# Patient Record
Sex: FEMALE | Race: WHITE | NOT HISPANIC OR LATINO | Employment: FULL TIME | URBAN - METROPOLITAN AREA
[De-identification: names, ages, dates, MRNs, and addresses within clinical notes are randomized per-mention and may not be internally consistent; named-entity substitution may affect disease eponyms.]

---

## 2017-04-28 ENCOUNTER — TRANSCRIBE ORDERS (OUTPATIENT)
Dept: INTERNAL MEDICINE | Facility: CLINIC | Age: 52
End: 2017-04-28

## 2017-04-28 DIAGNOSIS — Z12.31 VISIT FOR SCREENING MAMMOGRAM: Primary | ICD-10-CM

## 2017-08-16 DIAGNOSIS — G44.219 EPISODIC TENSION-TYPE HEADACHE, NOT INTRACTABLE: ICD-10-CM

## 2017-08-17 RX ORDER — RIZATRIPTAN BENZOATE 10 MG/1
TABLET, ORALLY DISINTEGRATING ORAL
Qty: 133 TABLET | Refills: 0 | OUTPATIENT
Start: 2017-08-17

## 2017-10-11 ENCOUNTER — HOSPITAL ENCOUNTER (OUTPATIENT)
Dept: MAMMOGRAPHY | Facility: HOSPITAL | Age: 52
Discharge: HOME | End: 2017-10-11
Attending: INTERNAL MEDICINE | Admitting: INTERNAL MEDICINE

## 2017-10-11 DIAGNOSIS — Z12.31 VISIT FOR SCREENING MAMMOGRAM: ICD-10-CM

## 2017-10-11 PROCEDURE — G0202 SCR MAMMO BI INCL CAD: HCPCS

## 2017-10-11 PROCEDURE — 77063 BREAST TOMOSYNTHESIS BI: CPT

## 2017-10-12 PROCEDURE — 77067 SCR MAMMO BI INCL CAD: CPT | Performed by: RADIOLOGY

## 2017-10-12 PROCEDURE — 77063 BREAST TOMOSYNTHESIS BI: CPT | Performed by: RADIOLOGY

## 2017-12-19 ENCOUNTER — OFFICE VISIT (OUTPATIENT)
Dept: INTERNAL MEDICINE | Facility: CLINIC | Age: 52
End: 2017-12-19

## 2017-12-19 VITALS
BODY MASS INDEX: 22.33 KG/M2 | HEART RATE: 72 BPM | SYSTOLIC BLOOD PRESSURE: 120 MMHG | WEIGHT: 131.13 LBS | RESPIRATION RATE: 20 BRPM | TEMPERATURE: 97.2 F | DIASTOLIC BLOOD PRESSURE: 70 MMHG

## 2017-12-19 DIAGNOSIS — G44.219 EPISODIC TENSION-TYPE HEADACHE, NOT INTRACTABLE: ICD-10-CM

## 2017-12-19 DIAGNOSIS — J06.9 UPPER RESPIRATORY TRACT INFECTION, UNSPECIFIED TYPE: Primary | ICD-10-CM

## 2017-12-19 LAB
EXPIRATION DATE: NORMAL
FLUAV AG NPH QL: NEGATIVE
FLUBV AG NPH QL: NEGATIVE
INTERNAL CONTROL: NORMAL
Lab: NORMAL

## 2017-12-19 PROCEDURE — 99214 OFFICE O/P EST MOD 30 MIN: CPT | Performed by: INTERNAL MEDICINE

## 2017-12-19 PROCEDURE — 87804 INFLUENZA ASSAY W/OPTIC: CPT | Performed by: INTERNAL MEDICINE

## 2017-12-19 RX ORDER — RIZATRIPTAN BENZOATE 10 MG/1
10 TABLET, ORALLY DISINTEGRATING ORAL ONCE AS NEEDED
Qty: 9 TABLET | Refills: 11 | Status: SHIPPED | OUTPATIENT
Start: 2017-12-19 | End: 2020-01-13 | Stop reason: SDUPTHER

## 2017-12-19 RX ORDER — MELOXICAM 15 MG/1
15 TABLET ORAL DAILY
Qty: 30 TABLET | Refills: 0 | OUTPATIENT
Start: 2017-12-19 | End: 2020-08-22

## 2017-12-19 RX ORDER — AMOXICILLIN 875 MG/1
875 TABLET, COATED ORAL 2 TIMES DAILY
Qty: 20 TABLET | Refills: 0 | Status: SHIPPED | OUTPATIENT
Start: 2017-12-19 | End: 2017-12-29

## 2017-12-19 RX ORDER — MELOXICAM 15 MG/1
TABLET ORAL
Refills: 0 | COMMUNITY
Start: 2017-10-25 | End: 2017-12-19 | Stop reason: SDUPTHER

## 2017-12-19 NOTE — PROGRESS NOTES
Yanira Arshad is a 52 y.o. female.     Chief Complaint   Patient presents with   • Cough     sinus drainage    • URI       History obtained from the patient.      Cough   This is a new problem. Episode onset: 2-3 days. The problem has been gradually worsening. The cough is non-productive (dry). Associated symptoms include nasal congestion, postnasal drip, rhinorrhea (clear) and shortness of breath. Pertinent negatives include no chest pain, chills, ear pain, eye redness, fever, headaches, hemoptysis, myalgias, rash, sore throat (scratchy) or wheezing. Treatments tried: Robitussin. The treatment provided mild relief. There is no history of asthma or environmental allergies.   URI    This is a new problem. The current episode started today. The problem has been unchanged. There has been no fever. Associated symptoms include congestion, coughing, rhinorrhea (clear) and sinus pain. Pertinent negatives include no abdominal pain, chest pain, diarrhea, ear pain, headaches, joint pain, joint swelling, nausea, rash, sore throat (scratchy), swollen glands, vomiting or wheezing. Treatments tried: Robitussin. The treatment provided mild relief.      The patient states she will be visiting her elderly mother.    The following portions of the patient's history were reviewed and updated as appropriate: allergies, current medications, past family history, past medical history, past social history, past surgical history and problem list.      Review of Systems   Constitutional: Negative for chills and fever.   HENT: Positive for congestion, postnasal drip, rhinorrhea (clear), sinus pain and sinus pressure. Negative for ear pain, sore throat (scratchy) and voice change.    Eyes: Negative for pain, discharge, redness and itching.   Respiratory: Positive for cough and shortness of breath. Negative for hemoptysis and wheezing.    Cardiovascular: Negative for chest pain.   Gastrointestinal: Negative for abdominal pain,  diarrhea, nausea and vomiting.   Musculoskeletal: Negative for arthralgias, joint pain, joint swelling and myalgias.   Skin: Negative for rash.   Allergic/Immunologic: Negative for environmental allergies.   Neurological: Negative for headaches.   Hematological: Negative for adenopathy.         Blood pressure 120/70, pulse 72, temperature 97.2 °F (36.2 °C), temperature source Temporal Artery , resp. rate 20, weight 59.5 kg (131 lb 2 oz).      Objective     Physical Exam   Constitutional: She appears well-developed and well-nourished.   HENT:   Head: Normocephalic and atraumatic.   Right Ear: Tympanic membrane, external ear and ear canal normal.   Left Ear: Tympanic membrane, external ear and ear canal normal.   Mouth/Throat: Oropharynx is clear and moist and mucous membranes are normal. No oral lesions.   Tonsils normal.  No sinus tenderness to palpation.   Eyes: Conjunctivae are normal.   Neck: Normal range of motion. Neck supple.   Cardiovascular: Normal rate and regular rhythm.    No murmur heard.  Pulmonary/Chest: Effort normal. She has wheezes (scattered). She has no rhonchi.   Lymphadenopathy:     She has no cervical adenopathy (there is tenderness to palpation in the anterior, but not posterior,  cervical area bilaterally).   Skin: No rash noted.   Psychiatric: She has a normal mood and affect.   Nursing note and vitals reviewed.         Results for orders placed or performed in visit on 12/19/17   POC Influenza A / B   Result Value Ref Range    Rapid Influenza A Ag NEGATIVE     Rapid Influenza B Ag NEGATIVE     Internal Control Passed Passed    Lot Number [id number]     Expiration Date 6-19          Assessment/Plan   Myar was seen today for cough and uri.    Diagnoses and all orders for this visit:    Upper respiratory tract infection, unspecified type  -     POC Influenza A / B  -     amoxicillin (AMOXIL) 875 MG tablet; Take 1 tablet by mouth 2 (Two) Times a Day for 10 days.    Episodic tension-type headache,  not intractable  -     rizatriptan MLT (MAXALT-MLT) 10 MG disintegrating tablet; Take 1 tablet by mouth 1 (One) Time As Needed (for headache) for up to 1 dose. May repeat in 2 hours if needed    Other orders  -     meloxicam (MOBIC) 15 MG tablet; Take 1 tablet by mouth Daily.        Return if symptoms worsen or fail to improve.

## 2018-03-28 ENCOUNTER — OFFICE VISIT (OUTPATIENT)
Dept: INTERNAL MEDICINE | Facility: CLINIC | Age: 53
End: 2018-03-28

## 2018-03-28 ENCOUNTER — TELEPHONE (OUTPATIENT)
Dept: INTERNAL MEDICINE | Facility: CLINIC | Age: 53
End: 2018-03-28

## 2018-03-28 VITALS
TEMPERATURE: 101.3 F | BODY MASS INDEX: 20.47 KG/M2 | SYSTOLIC BLOOD PRESSURE: 102 MMHG | WEIGHT: 120.2 LBS | RESPIRATION RATE: 18 BRPM | DIASTOLIC BLOOD PRESSURE: 60 MMHG | HEART RATE: 88 BPM

## 2018-03-28 DIAGNOSIS — R50.9 FEVER, UNSPECIFIED FEVER CAUSE: Primary | ICD-10-CM

## 2018-03-28 DIAGNOSIS — J10.1 INFLUENZA B: ICD-10-CM

## 2018-03-28 LAB
EXPIRATION DATE: NORMAL
FLUAV AG NPH QL: POSITIVE
FLUBV AG NPH QL: NEGATIVE
INTERNAL CONTROL: NORMAL
Lab: NORMAL

## 2018-03-28 PROCEDURE — 99213 OFFICE O/P EST LOW 20 MIN: CPT | Performed by: NURSE PRACTITIONER

## 2018-03-28 PROCEDURE — 87804 INFLUENZA ASSAY W/OPTIC: CPT | Performed by: NURSE PRACTITIONER

## 2018-03-28 RX ORDER — OSELTAMIVIR PHOSPHATE 75 MG/1
75 CAPSULE ORAL 2 TIMES DAILY
Qty: 10 CAPSULE | Refills: 0 | Status: SHIPPED | OUTPATIENT
Start: 2018-03-28 | End: 2018-08-22

## 2018-03-28 NOTE — TELEPHONE ENCOUNTER
----- Message from Vandana Garcia sent at 3/28/2018 11:39 AM EDT -----  PATIENT HAS ACHY JOINTS AND FEELS LIKE SHE HAS A SINUS INFECTION. PATIENT IS WANTING TO BE WORKED IN TODAY, BUT IS CURRENTLY AT ANOTHER APPT AND CAN'T BE IN UNTIL 2.     PLEASE CALL PATIENT At: [phone]    THANK YOU.

## 2018-03-28 NOTE — PROGRESS NOTES
Chief Complaint   Patient presents with   • Fever     started last night        Subjective     History of Present Illness   The pt has had fever aches 1 d took triptan for HA less but runny now cough now not really with ST felt N no D.     The following portions of the patient's history were reviewed and updated as appropriate: allergies, current medications, past family history, past medical history, past social history, past surgical history and problem list.    Review of Systems   Constitutional: Positive for fever. Negative for activity change, appetite change, chills and fatigue.   HENT: Positive for congestion, postnasal drip, rhinorrhea, sinus pressure, sneezing and sore throat.    Eyes: Negative for visual disturbance.   Respiratory: Positive for cough. Negative for shortness of breath.    Cardiovascular: Negative for chest pain, palpitations and leg swelling.   Gastrointestinal: Positive for nausea. Negative for abdominal pain, constipation, diarrhea and GERD.   Musculoskeletal: Negative for arthralgias and myalgias.   Skin: Negative for rash.   Allergic/Immunologic: Negative for environmental allergies.   Neurological: Positive for headaches. Negative for dizziness.   Psychiatric/Behavioral: Negative for sleep disturbance.   All other systems reviewed and are negative.      Objective   Physical Exam   Constitutional: She is oriented to person, place, and time. She appears well-developed and well-nourished.   HENT:   Head: Normocephalic and atraumatic.   Right Ear: Hearing, tympanic membrane, external ear and ear canal normal.   Left Ear: Hearing, tympanic membrane, external ear and ear canal normal.   Nose: Mucosal edema, rhinorrhea and sinus tenderness present. Right sinus exhibits no maxillary sinus tenderness and no frontal sinus tenderness. Left sinus exhibits no maxillary sinus tenderness and no frontal sinus tenderness.   Mouth/Throat: Uvula is midline and mucous membranes are normal. Posterior  oropharyngeal erythema present.   Eyes: Conjunctivae are normal. Pupils are equal, round, and reactive to light. Right eye exhibits no discharge. Left eye exhibits no discharge. No scleral icterus.   Neck: Normal range of motion. Neck supple. No thyromegaly present.   Cardiovascular: Normal rate, regular rhythm, normal heart sounds and intact distal pulses.  Exam reveals no friction rub.    No murmur heard.  Pulmonary/Chest: Effort normal and breath sounds normal.   Abdominal: Soft. Bowel sounds are normal. She exhibits no distension and no mass. There is no tenderness. There is no rebound and no guarding. No hernia.   Musculoskeletal: Normal range of motion. She exhibits no edema.   Lymphadenopathy:     She has no cervical adenopathy.   Neurological: She is alert and oriented to person, place, and time. She has normal reflexes.   Skin: Skin is warm and dry.   Psychiatric: She has a normal mood and affect. Her behavior is normal. Judgment and thought content normal.   Nursing note and vitals reviewed.      Results for orders placed or performed in visit on 03/28/18   POC Influenza A / B   Result Value Ref Range    Rapid Influenza A Ag POSITIVE     Rapid Influenza B Ag NEGATIVE     Internal Control Passed Passed    Lot Number [id number]     Expiration Date 11-8-20         Assessment/Plan   Problems Addressed this Visit     None      Visit Diagnoses     Fever, unspecified fever cause    -  Primary    Relevant Orders    POC Influenza A / B (Completed)    Influenza B        Relevant Medications    oseltamivir (TAMIFLU) 75 MG capsule      rest fluid treat with otc meds as directed no changes worsening will call.  Reviewed risk of secondary bacteria   Return if symptoms worsen or fail to improve.  RTC/call  If symptoms worsen  Meds MOA and SE's reviewed and pt v/u

## 2018-08-22 ENCOUNTER — OFFICE VISIT (OUTPATIENT)
Dept: INTERNAL MEDICINE | Facility: CLINIC | Age: 53
End: 2018-08-22

## 2018-08-22 VITALS
HEIGHT: 64 IN | BODY MASS INDEX: 20.32 KG/M2 | HEART RATE: 72 BPM | SYSTOLIC BLOOD PRESSURE: 112 MMHG | RESPIRATION RATE: 20 BRPM | WEIGHT: 119 LBS | DIASTOLIC BLOOD PRESSURE: 68 MMHG | TEMPERATURE: 98.3 F

## 2018-08-22 DIAGNOSIS — J45.20 MILD INTERMITTENT ASTHMA WITHOUT COMPLICATION: ICD-10-CM

## 2018-08-22 DIAGNOSIS — Z13.6 SCREENING FOR CARDIOVASCULAR CONDITION: ICD-10-CM

## 2018-08-22 DIAGNOSIS — Z23 NEED FOR STREPTOCOCCUS PNEUMONIAE VACCINATION: ICD-10-CM

## 2018-08-22 DIAGNOSIS — G43.709 CHRONIC MIGRAINE WITHOUT AURA WITHOUT STATUS MIGRAINOSUS, NOT INTRACTABLE: ICD-10-CM

## 2018-08-22 DIAGNOSIS — Z78.0 MENOPAUSE: ICD-10-CM

## 2018-08-22 DIAGNOSIS — R06.83 SNORING: ICD-10-CM

## 2018-08-22 DIAGNOSIS — Z01.419 ENCOUNTER FOR CERVICAL PAP SMEAR WITH PELVIC EXAM: ICD-10-CM

## 2018-08-22 DIAGNOSIS — Z00.00 ENCOUNTER FOR HEALTH MAINTENANCE EXAMINATION IN ADULT: Primary | ICD-10-CM

## 2018-08-22 PROBLEM — G43.909 MIGRAINE: Status: ACTIVE | Noted: 2018-08-22

## 2018-08-22 PROCEDURE — 99396 PREV VISIT EST AGE 40-64: CPT | Performed by: INTERNAL MEDICINE

## 2018-08-22 PROCEDURE — 90471 IMMUNIZATION ADMIN: CPT | Performed by: INTERNAL MEDICINE

## 2018-08-22 RX ORDER — ALBUTEROL SULFATE 90 UG/1
2 AEROSOL, METERED RESPIRATORY (INHALATION) EVERY 4 HOURS PRN
COMMUNITY
End: 2019-11-14

## 2018-08-22 NOTE — PATIENT INSTRUCTIONS
The patient agrees to return for fasting labs.    Recommended Shigrix (new Shingles vaccine) at the Pharmacy.        Health Maintenance for Postmenopausal Women  Menopause is a normal process in which your reproductive ability comes to an end. This process happens gradually over a span of months to years, usually between the ages of 48 and 55. Menopause is complete when you have missed 12 consecutive menstrual periods.  It is important to talk with your health care provider about some of the most common conditions that affect postmenopausal women, such as heart disease, cancer, and bone loss (osteoporosis). Adopting a healthy lifestyle and getting preventive care can help to promote your health and wellness. Those actions can also lower your chances of developing some of these common conditions.  What should I know about menopause?  During menopause, you may experience a number of symptoms, such as:  · Moderate-to-severe hot flashes.  · Night sweats.  · Decrease in sex drive.  · Mood swings.  · Headaches.  · Tiredness.  · Irritability.  · Memory problems.  · Insomnia.    Choosing to treat or not to treat menopausal changes is an individual decision that you make with your health care provider.  What should I know about hormone replacement therapy and supplements?  Hormone therapy products are effective for treating symptoms that are associated with menopause, such as hot flashes and night sweats. Hormone replacement carries certain risks, especially as you become older. If you are thinking about using estrogen or estrogen with progestin treatments, discuss the benefits and risks with your health care provider.  What should I know about heart disease and stroke?  Heart disease, heart attack, and stroke become more likely as you age. This may be due, in part, to the hormonal changes that your body experiences during menopause. These can affect how your body processes dietary fats, triglycerides, and cholesterol. Heart  attack and stroke are both medical emergencies.  There are many things that you can do to help prevent heart disease and stroke:  · Have your blood pressure checked at least every 1-2 years. High blood pressure causes heart disease and increases the risk of stroke.  · If you are 55-79 years old, ask your health care provider if you should take aspirin to prevent a heart attack or a stroke.  · Do not use any tobacco products, including cigarettes, chewing tobacco, or electronic cigarettes. If you need help quitting, ask your health care provider.  · It is important to eat a healthy diet and maintain a healthy weight.  ? Be sure to include plenty of vegetables, fruits, low-fat dairy products, and lean protein.  ? Avoid eating foods that are high in solid fats, added sugars, or salt (sodium).  · Get regular exercise. This is one of the most important things that you can do for your health.  ? Try to exercise for at least 150 minutes each week. The type of exercise that you do should increase your heart rate and make you sweat. This is known as moderate-intensity exercise.  ? Try to do strengthening exercises at least twice each week. Do these in addition to the moderate-intensity exercise.  · Know your numbers. Ask your health care provider to check your cholesterol and your blood glucose. Continue to have your blood tested as directed by your health care provider.    What should I know about cancer screening?  There are several types of cancer. Take the following steps to reduce your risk and to catch any cancer development as early as possible.  Breast Cancer  · Practice breast self-awareness.  ? This means understanding how your breasts normally appear and feel.  ? It also means doing regular breast self-exams. Let your health care provider know about any changes, no matter how small.  · If you are 40 or older, have a clinician do a breast exam (clinical breast exam or CBE) every year. Depending on your age, family  history, and medical history, it may be recommended that you also have a yearly breast X-ray (mammogram).  · If you have a family history of breast cancer, talk with your health care provider about genetic screening.  · If you are at high risk for breast cancer, talk with your health care provider about having an MRI and a mammogram every year.  · Breast cancer (BRCA) gene test is recommended for women who have family members with BRCA-related cancers. Results of the assessment will determine the need for genetic counseling and BRCA1 and for BRCA2 testing. BRCA-related cancers include these types:  ? Breast. This occurs in males or females.  ? Ovarian.  ? Tubal. This may also be called fallopian tube cancer.  ? Cancer of the abdominal or pelvic lining (peritoneal cancer).  ? Prostate.  ? Pancreatic.    Cervical, Uterine, and Ovarian Cancer  Your health care provider may recommend that you be screened regularly for cancer of the pelvic organs. These include your ovaries, uterus, and vagina. This screening involves a pelvic exam, which includes checking for microscopic changes to the surface of your cervix (Pap test).  · For women ages 21-65, health care providers may recommend a pelvic exam and a Pap test every three years. For women ages 30-65, they may recommend the Pap test and pelvic exam, combined with testing for human papilloma virus (HPV), every five years. Some types of HPV increase your risk of cervical cancer. Testing for HPV may also be done on women of any age who have unclear Pap test results.  · Other health care providers may not recommend any screening for nonpregnant women who are considered low risk for pelvic cancer and have no symptoms. Ask your health care provider if a screening pelvic exam is right for you.  · If you have had past treatment for cervical cancer or a condition that could lead to cancer, you need Pap tests and screening for cancer for at least 20 years after your treatment. If  Pap tests have been discontinued for you, your risk factors (such as having a new sexual partner) need to be reassessed to determine if you should start having screenings again. Some women have medical problems that increase the chance of getting cervical cancer. In these cases, your health care provider may recommend that you have screening and Pap tests more often.  · If you have a family history of uterine cancer or ovarian cancer, talk with your health care provider about genetic screening.  · If you have vaginal bleeding after reaching menopause, tell your health care provider.  · There are currently no reliable tests available to screen for ovarian cancer.    Lung Cancer  Lung cancer screening is recommended for adults 55-80 years old who are at high risk for lung cancer because of a history of smoking. A yearly low-dose CT scan of the lungs is recommended if you:  · Currently smoke.  · Have a history of at least 30 pack-years of smoking and you currently smoke or have quit within the past 15 years. A pack-year is smoking an average of one pack of cigarettes per day for one year.    Yearly screening should:  · Continue until it has been 15 years since you quit.  · Stop if you develop a health problem that would prevent you from having lung cancer treatment.    Colorectal Cancer  · This type of cancer can be detected and can often be prevented.  · Routine colorectal cancer screening usually begins at age 50 and continues through age 75.  · If you have risk factors for colon cancer, your health care provider may recommend that you be screened at an earlier age.  · If you have a family history of colorectal cancer, talk with your health care provider about genetic screening.  · Your health care provider may also recommend using home test kits to check for hidden blood in your stool.  · A small camera at the end of a tube can be used to examine your colon directly (sigmoidoscopy or colonoscopy). This is done to  check for the earliest forms of colorectal cancer.  · Direct examination of the colon should be repeated every 5-10 years until age 75. However, if early forms of precancerous polyps or small growths are found or if you have a family history or genetic risk for colorectal cancer, you may need to be screened more often.    Skin Cancer  · Check your skin from head to toe regularly.  · Monitor any moles. Be sure to tell your health care provider:  ? About any new moles or changes in moles, especially if there is a change in a mole's shape or color.  ? If you have a mole that is larger than the size of a pencil eraser.  · If any of your family members has a history of skin cancer, especially at a young age, talk with your health care provider about genetic screening.  · Always use sunscreen. Apply sunscreen liberally and repeatedly throughout the day.  · Whenever you are outside, protect yourself by wearing long sleeves, pants, a wide-brimmed hat, and sunglasses.    What should I know about osteoporosis?  Osteoporosis is a condition in which bone destruction happens more quickly than new bone creation. After menopause, you may be at an increased risk for osteoporosis. To help prevent osteoporosis or the bone fractures that can happen because of osteoporosis, the following is recommended:  · If you are 19-50 years old, get at least 1,000 mg of calcium and at least 600 mg of vitamin D per day.  · If you are older than age 50 but younger than age 70, get at least 1,200 mg of calcium and at least 600 mg of vitamin D per day.  · If you are older than age 70, get at least 1,200 mg of calcium and at least 800 mg of vitamin D per day.    Smoking and excessive alcohol intake increase the risk of osteoporosis. Eat foods that are rich in calcium and vitamin D, and do weight-bearing exercises several times each week as directed by your health care provider.  What should I know about how menopause affects my mental  health?  Depression may occur at any age, but it is more common as you become older. Common symptoms of depression include:  · Low or sad mood.  · Changes in sleep patterns.  · Changes in appetite or eating patterns.  · Feeling an overall lack of motivation or enjoyment of activities that you previously enjoyed.  · Frequent crying spells.    Talk with your health care provider if you think that you are experiencing depression.  What should I know about immunizations?  It is important that you get and maintain your immunizations. These include:  · Tetanus, diphtheria, and pertussis (Tdap) booster vaccine.  · Influenza every year before the flu season begins.  · Pneumonia vaccine.  · Shingles vaccine.    Your health care provider may also recommend other immunizations.  This information is not intended to replace advice given to you by your health care provider. Make sure you discuss any questions you have with your health care provider.  Document Released: 02/09/2007 Document Revised: 07/07/2017 Document Reviewed: 09/20/2016  [email or web address] Interactive Patient Education © 2018 [employer]

## 2018-08-22 NOTE — PROGRESS NOTES
Subjective     Chief Complaint:  Physical Exam.    History of Present Illness    History obtained from the patient.    The patient has a history of Asthma and Environmental Allergies, which are stable.     Symptoms: Has some shortness of breath with exertion, usually when running.  Denies resting shortness of breath, wheezing, cough, and hemoptysis.  Medication: Albuterol inhaler as needed.  She does not take any medication for her allergies.    The patient has history of Migraine Headaches, which have essentially resolved with a dietary change,  eliminating sugar.    Medication: Maxalt as needed.    The patient states her Osteoarthritis has also essentially resolved with her diet change.    Myra Arshad is a 53 y.o. female who presents for an Annual Physical.      The patient presents for a Pap Smear. She is G [1 ] P [0 ]. AB [1 ]. She is Menopausal.  Her last menstrual period was [2016].   She [denies] dyspareunia. She [is not] sexually active.  Her last Pap smear was [12/3/15, normal ]. She [denies] abnormal Pap Smears in the past. She [denies] sexually-transmitted diseases and HPV in the past. She [denies] JACOB Exposure in utero.     She [does] do self breast exam. She [denies) breast mass, breast pain, nipple inversion, or nipple discharge. Last Mammogram was [10/11/17-cat 1 ]. Last DEXA Scan was [N/A ]. Last Colonoscopy was [12/9/15, normal ].    Her MGM had Breast Cancer. She [denies] a family history of Colon Cancer, Colon Polyps, Uterine Cancer, Cervical Cancer, or Ovarian Cancer.    PMH, PSH, SocHx, FamHx, Allergies, and Medications: Reviewed and updated.    Outpatient Medications Prior to Visit   Medication Sig Dispense Refill   • meloxicam (MOBIC) 15 MG tablet Take 1 tablet by mouth Daily. (Patient taking differently: Take 15 mg by mouth Daily As Needed.) 30 tablet 0   • rizatriptan MLT (MAXALT-MLT) 10 MG disintegrating tablet Take 1 tablet by mouth 1 (One) Time As Needed (for headache) for up  to 1 dose. May repeat in 2 hours if needed 9 tablet 11   • oseltamivir (TAMIFLU) 75 MG capsule Take 1 capsule by mouth 2 (Two) Times a Day. 10 capsule 0     No facility-administered medications prior to visit.        Immunization History   Administered Date(s) Administered   • Influenza, Quadrivalent 11/10/2015   • Tdap 11/10/2015         Patient Active Problem List   Diagnosis   • Seasonal allergic rhinitis   • Asthma   • Menopause   • Migraine       Health Habits:  Dental Exam. up to date  Eye Exam. up to date  Hearing Loss:  No  Exercise: 3 times/week.  Current exercise activities include: light weights/kettlebells and running/ jogging  Diet: Healthy  Multivitamin: No    Safe Driving:  Yes  Seat Belt:  Yes  Bike Helmet:  N/A  Skin Screening:  Yes  Sunscreen: Yes  SBE / BESSY: Yes  Sexual Activity:  Not currently  Birth Control:  Menopause N/A  STD Prevention:  N/A    Last Pap: 12/3/15, negative  Last Mammogram:  10/11/17, cat 1  Last DEXA Scan: N/A  Last Colonoscopy: 12/9/15, normal  Last PSA: N/a    Social:    Social History     Social History   • Marital status: Single     Spouse name: N/A   • Number of children: 0   • Years of education: N/A     Occupational History   • Realtor      full time     Social History Main Topics   • Smoking status: Former Smoker     Start date: 1984     Quit date: 2003   • Smokeless tobacco: Never Used      Comment: 1-2 ppd [id number] (Quit)   • Alcohol use No      Comment: drank heavily approx [id number] (Quit)   • Drug use: No   • Sexual activity: Not Currently     Partners: Male     Birth control/ protection: Post-menopausal     Other Topics Concern   • Not on file     Social History Narrative   • No narrative on file         Current Medical Providers:    Akosua Caldwell MD (Internal Medicine / Pediatrics)    The [facility] providers who are involved in the care of this patient are listed above.         Review of Systems   Constitutional: Positive for fatigue. Negative for chills,  fever and unexpected weight change.        Has night sweats.  No generalized pain.   HENT: Negative for congestion, ear pain, hearing loss, nosebleeds, postnasal drip, rhinorrhea, sinus pressure, sneezing, sore throat, tinnitus and voice change.         Reports snoring.  She states she had a sleep study 5 years ago, was told she needed CPAP.   Eyes: Negative for photophobia, pain, discharge, redness, itching and visual disturbance.   Respiratory: Positive for chest tightness. Negative for cough, shortness of breath, wheezing and stridor.          Has dyspnea on exertion, not new, but no orthopnea or PND.  No chest congestion.   No  hemoptysis.   Cardiovascular: Negative for chest pain, palpitations and leg swelling.        No claudication or syncope.   Gastrointestinal: Negative for abdominal pain, blood in stool, constipation, diarrhea, nausea, rectal pain and vomiting.        No hematemesis.  No heartburn, dysphagia or odynophagia.  No belching or bloating.  No melena.   Endocrine: Negative for cold intolerance, heat intolerance, polydipsia, polyphagia and polyuria.        No hair loss or dry skin.  No generalized weakness.  No hot flashes.   Genitourinary: Positive for vaginal discharge (white intermittent). Negative for difficulty urinating, dyspareunia, dysuria, flank pain, frequency, hematuria, pelvic pain, urgency and vaginal bleeding.        No nocturia, incomplete emptying, or incontinence.   Musculoskeletal: Negative for arthralgias, back pain, gait problem, joint swelling, myalgias, neck pain and neck stiffness.        No joint stiffness. Hip pain resolved with a change in diet- cutting out sugar.   Skin: Negative for rash.        No new skin lesions or changes in skin lesions. No breast pain or masses.  No nipple discharge or nipple inversion.   Neurological: Positive for headaches (occasional migraine, mostly resolved with a change in diet- cutting out sugar.  ). Negative for dizziness, tremors,  "syncope, speech difficulty, weakness, light-headedness and numbness.        No tingling.  Has occasional memory loss and decreased concentration.   Hematological: Negative for adenopathy. Does not bruise/bleed easily.   Psychiatric/Behavioral: Negative for confusion, sleep disturbance and suicidal ideas. The patient is not nervous/anxious.         No depression.           Objective     Vitals:    08/22/18 1400   BP: 112/68   BP Location: Right arm   Pulse: 72   Resp: 20   Temp: 98.3 °F (36.8 °C)   TempSrc: Temporal Artery    Weight: 54 kg (119 lb)   Height: 162.6 cm (64\")       Body mass index is 20.43 kg/m².    Physical Exam   Constitutional: She appears well-developed and well-nourished.   HENT:   Head: Normocephalic and atraumatic.   Right Ear: Tympanic membrane, external ear and ear canal normal.   Left Ear: Tympanic membrane, external ear and ear canal normal.   Mouth/Throat: Oropharynx is clear and moist.   Eyes: Pupils are equal, round, and reactive to light. Conjunctivae and EOM are normal.   Neck: Normal range of motion. Neck supple. Carotid bruit is not present. No thyromegaly present.   Cardiovascular: Normal rate, regular rhythm and intact distal pulses.  Exam reveals no gallop and no friction rub.    No murmur heard.  Pulmonary/Chest: Effort normal and breath sounds normal. Right breast exhibits no inverted nipple, no mass, no nipple discharge, no skin change and no tenderness. Left breast exhibits no inverted nipple, no mass, no nipple discharge, no skin change and no tenderness.   Abdominal: Soft. Bowel sounds are normal. She exhibits no distension, no abdominal bruit and no mass. There is no hepatosplenomegaly. There is no tenderness.   Rectal exam deferred.   Genitourinary: Uterus normal. Uterus is not tender. Cervix exhibits no motion tenderness, no discharge and no friability. Right adnexum displays no mass and no tenderness. Left adnexum displays no mass and no tenderness. No vaginal discharge " found.   Genitourinary Comments: There is vaginal atrophy.  Cervix is without lesions or erythema.   Musculoskeletal: Normal range of motion. She exhibits no edema or tenderness.   Lymphadenopathy:     She has no cervical adenopathy.     She has no axillary adenopathy.        Right: No inguinal and no supraclavicular adenopathy present.        Left: No inguinal and no supraclavicular adenopathy present.   Neurological: She is alert. She has normal strength and normal reflexes. No cranial nerve deficit. She exhibits normal muscle tone. Coordination and gait normal.   Skin: No rash noted.   No atypical skin lesions.   Psychiatric: She has a normal mood and affect.   Nursing note and vitals reviewed.    Counseling was given to patient for the following topics: appropriate exercise, healthy eating habits, disease prevention, risk factors for cancer, importance of self breast exam and breast health, bone health, sun safety, seatbelt use, safe driving and safe sex.  Written information provided to patient on these topics and other health maintenance issues.        Assessment/Plan       Diagnoses and all orders for this visit:    Encounter for health maintenance examination in adult  -     Lipid Panel; Future  -     Comprehensive Metabolic Panel; Future  -     TSH; Future  -     Vitamin D 25 Hydroxy; Future  -     CBC & Differential; Future    Encounter for cervical Pap smear with pelvic exam  -     Liquid-based Pap Smear, Screening; Future  -     Liquid-based Pap Smear, Screening    Menopause    Mild intermittent asthma without complication    Snoring  -     Ambulatory Referral to Sleep Medicine    Chronic migraine without aura without status migrainosus, not intractable    Screening for cardiovascular condition  -     Lipid Panel; Future  -     Comprehensive Metabolic Panel; Future  -     TSH; Future  -     Vitamin D 25 Hydroxy; Future  -     CBC & Differential; Future    Need for Streptococcus pneumoniae vaccination  -      Pneumococcal Polysaccharide Vaccine 23-Valent Greater Than or Equal To 1yo Subcutaneous / IM          Return in about 1 year (around 8/22/2019) for Annual physical, fasting.

## 2018-08-23 PROCEDURE — 90732 PPSV23 VACC 2 YRS+ SUBQ/IM: CPT | Performed by: INTERNAL MEDICINE

## 2018-09-24 ENCOUNTER — CONSULT (OUTPATIENT)
Dept: SLEEP MEDICINE | Facility: HOSPITAL | Age: 53
End: 2018-09-24

## 2018-09-24 VITALS
DIASTOLIC BLOOD PRESSURE: 57 MMHG | BODY MASS INDEX: 20.28 KG/M2 | HEART RATE: 60 BPM | WEIGHT: 118.8 LBS | SYSTOLIC BLOOD PRESSURE: 110 MMHG | HEIGHT: 64 IN | OXYGEN SATURATION: 96 %

## 2018-09-24 DIAGNOSIS — R29.818 SUSPECTED SLEEP APNEA: ICD-10-CM

## 2018-09-24 DIAGNOSIS — R06.83 SNORING: ICD-10-CM

## 2018-09-24 DIAGNOSIS — G47.19 EXCESSIVE DAYTIME SLEEPINESS: Primary | ICD-10-CM

## 2018-09-24 PROCEDURE — 99213 OFFICE O/P EST LOW 20 MIN: CPT | Performed by: NURSE PRACTITIONER

## 2018-09-24 NOTE — PATIENT INSTRUCTIONS
Hypersomnia  Hypersomnia is when you feel extremely tired during the day even though you're getting plenty of sleep at night. You may need to take naps during the day, and you may also be extremely difficult to wake up when you are sleeping.  What are the causes?  The cause of your hypersomnia may not be known. Hypersomnia may be caused by:  · Medicines.  · Sleep disorders, such as narcolepsy.  · Trauma or injury to your head or nervous system.  · Using drugs or alcohol.  · Tumors.  · Medical conditions, such as depression or hypothyroidism.  · Genetics.    What are the signs or symptoms?  The main symptoms of hypersomnia include:  · Feeling extremely tired throughout the day.  · Being very difficult to wake up.  · Sleeping for longer and longer periods.  · Taking naps throughout the day.    Other symptoms may include:  · Feeling:  ? Restless.  ? Annoyed.  ? Anxious.  ? Low energy.  · Having difficulty:  ? Remembering.  ? Speaking.  ? Thinking.  · Losing your appetite.  · Experiencing hallucinations.    How is this diagnosed?  Hypersomnia may be diagnosed by:  · Medical history and physical exam. This will include a sleep history.  · Completing sleep logs.  · Tests may also be done, such as:  ? Polysomnography.  ? Multiple sleep latency test (MSLT).    How is this treated?  There is no cure for hypersomnia, but treatment can be very effective in helping manage the condition. Treatment may include:  · Lifestyle and sleeping strategies to help cope with the condition.  · Stimulant medicines.  · Treating any underlying causes of hypersomnia.    Follow these instructions at home:  · Take medicines only as directed by your health care provider.  · Schedule short naps for when you feel sleepiest during the day. Tell your employer or teachers that you have hypersomnia. You may be able to adjust your schedule to include time for naps.  · Avoid drinking alcohol or caffeinated beverages.  · Do not eat a heavy meal before  bedtime. Eat at about the same times every day.  · Do not drive or operate heavy machinery if you are sleepy.  · Do not swim or go out on the water without a life jacket.  · If possible, adjust your schedule so that you do not have to work or be active at night.  · Keep all follow-up visits as directed by your health care provider. This is important.  Contact a health care provider if:  · You have new symptoms.  · Your symptoms get worse.  Get help right away if:  You have serious thoughts of hurting yourself or someone else.  This information is not intended to replace advice given to you by your health care provider. Make sure you discuss any questions you have with your health care provider.  Document Released: 12/08/2003 Document Revised: 05/25/2017 Document Reviewed: 07/23/2015  [employer] Interactive Patient Education © 2018 [employer]  Sleep Apnea  Sleep apnea is a condition that affects breathing. People with sleep apnea have moments during sleep when their breathing pauses briefly or gets shallow. Sleep apnea can cause these symptoms:  · Trouble staying asleep.  · Sleepiness or tiredness during the day.  · Irritability.  · Loud snoring.  · Morning headaches.  · Trouble concentrating.  · Forgetting things.  · Less interest in sex.  · Being sleepy for no reason.  · Mood swings.  · Personality changes.  · Depression.  · Waking up a lot during the night to pee (urinate).  · Dry mouth.  · Sore throat.    Follow these instructions at home:  · Make any changes in your routine that your doctor recommends.  · Eat a healthy, well-balanced diet.  · Take over-the-counter and prescription medicines only as told by your doctor.  · Avoid using alcohol, calming medicines (sedatives), and narcotic medicines.  · Take steps to lose weight if you are overweight.  · If you were given a machine (device) to use while you sleep, use it only as told by your doctor.  · Do not use any tobacco products, such as cigarettes, chewing  tobacco, and e-cigarettes. If you need help quitting, ask your doctor.  · Keep all follow-up visits as told by your doctor. This is important.  Contact a doctor if:  · The machine that you were given to use during sleep is uncomfortable or does not seem to be working.  · Your symptoms do not get better.  · Your symptoms get worse.  Get help right away if:  · Your chest hurts.  · You have trouble breathing in enough air (shortness of breath).  · You have an uncomfortable feeling in your back, arms, or stomach.  · You have trouble talking.  · One side of your body feels weak.  · A part of your face is hanging down (drooping).  These symptoms may be an emergency. Do not wait to see if the symptoms will go away. Get medical help right away. Call your local emergency services (911 in the [place] Do not drive yourself to the hospital.  This information is not intended to replace advice given to you by your health care provider. Make sure you discuss any questions you have with your health care provider.  Document Released: 09/26/2009 Document Revised: 08/13/2017 Document Reviewed: 09/26/2016  [employer] Interactive Patient Education © 2018 [employer]

## 2018-09-24 NOTE — PROGRESS NOTES
Subjective: Sleeping Problem        Chief Complaint:   Chief Complaint   Patient presents with   • Sleeping Problem       HPI:    Myra Arshad is a 53 y.o. female here to establish care.  She does complain of excessive daytime sleepiness, lack of energy, and forgetfulness ×5 years.  She does frequently fall asleep while watching TV and she does fall asleep at stop lights at times.  She has been told in the past that she had light snoring.  She does awaken with a dry mouth and morning headaches.  She does complain of some nasal congestion at night.  Patient denies reflux, hypnagogic hallucinations, no history of cataplexy.  She does frequently move her legs in the bed at night.  She goes to sleep without difficulty and usually awakens around 3 AM and has trouble falling back asleep.  She does have excessive sweating during the night and also grinds her teeth.  She has lost 15 pounds in the past year with exercising and eating better.  During the weekday she does go to bed around 11 PM and gets up from 5 to 5:30.  On the weekends she goes to bed at 11 PM and awakens at 7.  She does think she is awakening 3-4 times in the night.  She never has a refreshing sleep.    She denies apnea, coughing, or choking in the night.    Her [place] score is 11/24.  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  She smoked one to 2 packs of cigarettes daily but has stopped since 3/3/2003.  She drinks one cup of coffee daily and 1-2 glasses of tea a day.  She has used recreational drugs in the past but hasn't had any since 10/9/01.  She is currently very conscientious about her diet and exercise.          Current medications are:   Current Outpatient Prescriptions:   •  albuterol (PROVENTIL HFA;VENTOLIN HFA) 108 (90 Base) MCG/ACT inhaler, Inhale 2 puffs Every 4 (Four) Hours  As Needed for Wheezing., Disp: , Rfl:   •  meloxicam (MOBIC) 15 MG tablet, Take 1 tablet by mouth Daily. (Patient taking differently: Take 15 mg by mouth Daily As Needed.), Disp: 30 tablet, Rfl: 0  •  rizatriptan MLT (MAXALT-MLT) 10 MG disintegrating tablet, Take 1 tablet by mouth 1 (One) Time As Needed (for headache) for up to 1 dose. May repeat in 2 hours if needed, Disp: 9 tablet, Rfl: 11.      The patient's relevant past medical, surgical, family and social history were reviewed and updated in Epic as appropriate.       Review of Systems   Constitutional: Positive for fatigue. Negative for diaphoresis.   HENT:        Patient grinds her teeth at night   Respiratory: Positive for cough and wheezing.    Gastrointestinal: Positive for constipation.   Neurological: Positive for headaches.   Psychiatric/Behavioral: Positive for confusion, dysphoric mood and sleep disturbance.   All other systems reviewed and are negative.        Objective:    Physical Exam   Constitutional: She is oriented to person, place, and time. She appears well-developed and well-nourished.   HENT:   Head: Normocephalic and atraumatic.   Mouth/Throat: Oropharynx is clear and moist.   Mallampati 2 anatomy   Eyes: Conjunctivae are normal.   Neck: Neck supple. No thyromegaly present.   Cardiovascular: Normal rate and regular rhythm.    Pulmonary/Chest: Effort normal and breath sounds normal.   Lymphadenopathy:     She has no cervical adenopathy.   Neurological: She is alert and oriented to person, place, and time.   Nursing note and vitals reviewed.        ASSESSMENT/PLAN    Myra was seen today for sleeping problem.    Diagnoses and all orders for this visit:    Excessive daytime sleepiness  -     Home Sleep Study; Future    Snoring  -     Home Sleep Study; Future    Suspected sleep apnea  -     Home Sleep Study; Future            1. Counseled patient regarding multimodal approach with healthy nutrition, healthy sleep, regular physical activity,  social activities, counseling, and medications. Encouraged to practice lateral sleep position. Avoid alcohol and sedatives close to bedtime.  2.   Patient presents with a history as noted above.  She is having disrupted sleep.  Could be related to sleep-disordered breathing.  To further evaluate this a think we need to consider polysomnogram.  I will then see her back after the study.  We've discussed possible therapies for sleep-disordered breathing including CPAP, weight control, oral appliances, and surgery.  We've also discussed the long-term consequences of untreated sleep apnea.  I have reviewed the results of my evaluation and impression and discussed my recommendations in detail with the patient.      Signed by  Ivory Marquez, SOHEILA    September 24, 2018      CC: Akosua Caldwell MD Gullo, Sibel S, MD

## 2018-10-19 ENCOUNTER — HOSPITAL ENCOUNTER (OUTPATIENT)
Dept: SLEEP MEDICINE | Facility: HOSPITAL | Age: 53
Discharge: HOME | End: 2018-10-19
Admitting: NURSE PRACTITIONER

## 2018-10-19 VITALS
OXYGEN SATURATION: 95 % | HEART RATE: 59 BPM | SYSTOLIC BLOOD PRESSURE: 101 MMHG | DIASTOLIC BLOOD PRESSURE: 56 MMHG | WEIGHT: 121 LBS | HEIGHT: 64 IN | BODY MASS INDEX: 20.66 KG/M2

## 2018-10-19 DIAGNOSIS — R29.818 SUSPECTED SLEEP APNEA: ICD-10-CM

## 2018-10-19 DIAGNOSIS — R06.83 SNORING: ICD-10-CM

## 2018-10-19 DIAGNOSIS — G47.19 EXCESSIVE DAYTIME SLEEPINESS: ICD-10-CM

## 2018-10-19 PROCEDURE — 95806 SLEEP STUDY UNATT&RESP EFFT: CPT | Performed by: INTERNAL MEDICINE

## 2018-10-19 PROCEDURE — 95806 SLEEP STUDY UNATT&RESP EFFT: CPT

## 2018-10-22 DIAGNOSIS — G47.33 OBSTRUCTIVE SLEEP APNEA, ADULT: Primary | ICD-10-CM

## 2018-10-22 DIAGNOSIS — R06.83 SNORING: ICD-10-CM

## 2018-10-29 ENCOUNTER — OFFICE VISIT (OUTPATIENT)
Dept: SLEEP MEDICINE | Facility: HOSPITAL | Age: 53
End: 2018-10-29

## 2018-10-29 VITALS
HEART RATE: 70 BPM | BODY MASS INDEX: 20.96 KG/M2 | HEIGHT: 64 IN | WEIGHT: 122.8 LBS | OXYGEN SATURATION: 96 % | DIASTOLIC BLOOD PRESSURE: 53 MMHG | SYSTOLIC BLOOD PRESSURE: 111 MMHG

## 2018-10-29 DIAGNOSIS — G47.33 OBSTRUCTIVE SLEEP APNEA, ADULT: Primary | ICD-10-CM

## 2018-10-29 DIAGNOSIS — G47.10 HYPERSOMNIA: ICD-10-CM

## 2018-10-29 PROCEDURE — 99213 OFFICE O/P EST LOW 20 MIN: CPT | Performed by: NURSE PRACTITIONER

## 2018-10-29 NOTE — PATIENT INSTRUCTIONS
Hypersomnia  Hypersomnia is when you feel extremely tired during the day even though you're getting plenty of sleep at night. You may need to take naps during the day, and you may also be extremely difficult to wake up when you are sleeping.  What are the causes?  The cause of your hypersomnia may not be known. Hypersomnia may be caused by:  · Medicines.  · Sleep disorders, such as narcolepsy.  · Trauma or injury to your head or nervous system.  · Using drugs or alcohol.  · Tumors.  · Medical conditions, such as depression or hypothyroidism.  · Genetics.    What are the signs or symptoms?  The main symptoms of hypersomnia include:  · Feeling extremely tired throughout the day.  · Being very difficult to wake up.  · Sleeping for longer and longer periods.  · Taking naps throughout the day.    Other symptoms may include:  · Feeling:  ? Restless.  ? Annoyed.  ? Anxious.  ? Low energy.  · Having difficulty:  ? Remembering.  ? Speaking.  ? Thinking.  · Losing your appetite.  · Experiencing hallucinations.    How is this diagnosed?  Hypersomnia may be diagnosed by:  · Medical history and physical exam. This will include a sleep history.  · Completing sleep logs.  · Tests may also be done, such as:  ? Polysomnography.  ? Multiple sleep latency test (MSLT).    How is this treated?  There is no cure for hypersomnia, but treatment can be very effective in helping manage the condition. Treatment may include:  · Lifestyle and sleeping strategies to help cope with the condition.  · Stimulant medicines.  · Treating any underlying causes of hypersomnia.    Follow these instructions at home:  · Take medicines only as directed by your health care provider.  · Schedule short naps for when you feel sleepiest during the day. Tell your employer or teachers that you have hypersomnia. You may be able to adjust your schedule to include time for naps.  · Avoid drinking alcohol or caffeinated beverages.  · Do not eat a heavy meal before  bedtime. Eat at about the same times every day.  · Do not drive or operate heavy machinery if you are sleepy.  · Do not swim or go out on the water without a life jacket.  · If possible, adjust your schedule so that you do not have to work or be active at night.  · Keep all follow-up visits as directed by your health care provider. This is important.  Contact a health care provider if:  · You have new symptoms.  · Your symptoms get worse.  Get help right away if:  You have serious thoughts of hurting yourself or someone else.  This information is not intended to replace advice given to you by your health care provider. Make sure you discuss any questions you have with your health care provider.  Document Released: 12/08/2003 Document Revised: 05/25/2017 Document Reviewed: 07/23/2015  [employer] Interactive Patient Education © 2018 [employer]  Sleep Apnea  Sleep apnea is a condition that affects breathing. People with sleep apnea have moments during sleep when their breathing pauses briefly or gets shallow. Sleep apnea can cause these symptoms:  · Trouble staying asleep.  · Sleepiness or tiredness during the day.  · Irritability.  · Loud snoring.  · Morning headaches.  · Trouble concentrating.  · Forgetting things.  · Less interest in sex.  · Being sleepy for no reason.  · Mood swings.  · Personality changes.  · Depression.  · Waking up a lot during the night to pee (urinate).  · Dry mouth.  · Sore throat.    Follow these instructions at home:  · Make any changes in your routine that your doctor recommends.  · Eat a healthy, well-balanced diet.  · Take over-the-counter and prescription medicines only as told by your doctor.  · Avoid using alcohol, calming medicines (sedatives), and narcotic medicines.  · Take steps to lose weight if you are overweight.  · If you were given a machine (device) to use while you sleep, use it only as told by your doctor.  · Do not use any tobacco products, such as cigarettes, chewing  tobacco, and e-cigarettes. If you need help quitting, ask your doctor.  · Keep all follow-up visits as told by your doctor. This is important.  Contact a doctor if:  · The machine that you were given to use during sleep is uncomfortable or does not seem to be working.  · Your symptoms do not get better.  · Your symptoms get worse.  Get help right away if:  · Your chest hurts.  · You have trouble breathing in enough air (shortness of breath).  · You have an uncomfortable feeling in your back, arms, or stomach.  · You have trouble talking.  · One side of your body feels weak.  · A part of your face is hanging down (drooping).  These symptoms may be an emergency. Do not wait to see if the symptoms will go away. Get medical help right away. Call your local emergency services (911 in the [place] Do not drive yourself to the hospital.  This information is not intended to replace advice given to you by your health care provider. Make sure you discuss any questions you have with your health care provider.  Document Released: 09/26/2009 Document Revised: 08/13/2017 Document Reviewed: 09/26/2016  [employer] Interactive Patient Education © 2018 [employer]

## 2018-10-29 NOTE — PROGRESS NOTES
Subjective: Follow-up        Chief Complaint:   Chief Complaint   Patient presents with   • Follow-up       HPI:    Myra Arshad is a 53 y.o. female here for follow-up of sam.  Patient was seen here in consult 9/24/18 for excessive sleepiness and forgetfulness.  She did have a sleep study on 10/19/18 that showed mild sleep apnea with an AHI of 9.1.  Patient complains of extreme daytime sleepiness that does interfere with daily activities.  Her Martelle scores 12/24.  Patient will like to start CPAP therapy.    Her primary care doctor is Dr. Caldwell.    Current medications are:   Current Outpatient Prescriptions:   •  albuterol (PROVENTIL HFA;VENTOLIN HFA) 108 (90 Base) MCG/ACT inhaler, Inhale 2 puffs Every 4 (Four) Hours As Needed for Wheezing., Disp: , Rfl:   •  meloxicam (MOBIC) 15 MG tablet, Take 1 tablet by mouth Daily. (Patient taking differently: Take 15 mg by mouth Daily As Needed.), Disp: 30 tablet, Rfl: 0  •  rizatriptan MLT (MAXALT-MLT) 10 MG disintegrating tablet, Take 1 tablet by mouth 1 (One) Time As Needed (for headache) for up to 1 dose. May repeat in 2 hours if needed, Disp: 9 tablet, Rfl: 11.      The patient's relevant past medical, surgical, family and social history were reviewed and updated in Epic as appropriate.       Review of Systems   Constitutional: Positive for fatigue.   HENT: Positive for congestion, postnasal drip, rhinorrhea and sinus pressure.    Respiratory: Positive for apnea.    Neurological: Positive for headaches.   Psychiatric/Behavioral: Positive for sleep disturbance.   All other systems reviewed and are negative.        Objective:    Physical Exam   Constitutional: She is oriented to person, place, and time. She appears well-developed and well-nourished.   HENT:   Head: Normocephalic and atraumatic.   Mouth/Throat: Oropharynx is clear and moist.   Mallampati 2 anatomy   Eyes: Conjunctivae are normal.   Neck: Neck supple. No thyromegaly present.   Cardiovascular: Normal rate and  regular rhythm.    Pulmonary/Chest: Effort normal and breath sounds normal.   Lymphadenopathy:     She has no cervical adenopathy.   Neurological: She is alert and oriented to person, place, and time.   Skin: Skin is warm and dry.   Psychiatric: She has a normal mood and affect. Her behavior is normal. Judgment and thought content normal.   Nursing note and vitals reviewed.        ASSESSMENT/PLAN    Myra was seen today for follow-up.    Diagnoses and all orders for this visit:    Obstructive sleep apnea, adult    Hypersomnia            1. Counseled patient regarding multimodal approach with healthy nutrition, healthy sleep, regular physical activity, social activities, counseling, and medications. Encouraged to practice lateral sleep position. Avoid alcohol and sedatives close to bedtime.  2.   Patient does show mild sleep apnea and are discussing possible therapies including CPAP, weight control, oral appliances, and surgery patient is decided to proceed with CPAP therapy.  I will see her back 31-90 days after initiation.  I have reviewed the results of my evaluation and impression and discussed my recommendations in detail with the patient.      Signed by  SOHEILA Toussaint    October 29, 2018      CC: Akosua Caldwell MD          No ref. provider found

## 2018-10-31 PROBLEM — G47.33 OSA (OBSTRUCTIVE SLEEP APNEA): Status: ACTIVE | Noted: 2018-10-31

## 2018-12-21 ENCOUNTER — TELEPHONE (OUTPATIENT)
Dept: INTERNAL MEDICINE | Facility: CLINIC | Age: 53
End: 2018-12-21

## 2019-01-15 ENCOUNTER — OFFICE VISIT (OUTPATIENT)
Dept: SLEEP MEDICINE | Facility: HOSPITAL | Age: 54
End: 2019-01-15

## 2019-01-15 VITALS
OXYGEN SATURATION: 96 % | BODY MASS INDEX: 20.86 KG/M2 | SYSTOLIC BLOOD PRESSURE: 95 MMHG | HEART RATE: 56 BPM | DIASTOLIC BLOOD PRESSURE: 52 MMHG | WEIGHT: 122.2 LBS | HEIGHT: 64 IN

## 2019-01-15 DIAGNOSIS — G47.33 OSA (OBSTRUCTIVE SLEEP APNEA): Primary | ICD-10-CM

## 2019-01-15 PROCEDURE — 99213 OFFICE O/P EST LOW 20 MIN: CPT | Performed by: NURSE PRACTITIONER

## 2019-01-15 NOTE — PROGRESS NOTES
Subjective:   Follow-up      Chief Complaint:   Chief Complaint   Patient presents with   • Follow-up       HPI:    Myra Arshad is a 53 y.o. female here for follow-up of sam.  Patient was last seen 10/29/18 for mild obstructive sleep apnea.  Patient was initiated on CPAP therapy.  Patient states she is doing fairly well although the last 2 days she has felt very sleepy upon awakening.  However, before that time she was feeling very refreshed.  Coincidentally, she did stop all sugar products 2 days ago.  She does seem to think this is this associated with the sluggishness.  She is sleeping 4-5 hours nightly and states she cannot increase this because she likes to stay up late and watch television.  Her Clio score is 8/24.  She is doing well with current settings and wishes to consider CPAP therapy.    Current medications are:   Current Outpatient Medications:   •  albuterol (PROVENTIL HFA;VENTOLIN HFA) 108 (90 Base) MCG/ACT inhaler, Inhale 2 puffs Every 4 (Four) Hours As Needed for Wheezing., Disp: , Rfl:   •  meloxicam (MOBIC) 15 MG tablet, Take 1 tablet by mouth Daily. (Patient taking differently: Take 15 mg by mouth Daily As Needed.), Disp: 30 tablet, Rfl: 0  •  rizatriptan MLT (MAXALT-MLT) 10 MG disintegrating tablet, Take 1 tablet by mouth 1 (One) Time As Needed (for headache) for up to 1 dose. May repeat in 2 hours if needed, Disp: 9 tablet, Rfl: 11.      The patient's relevant past medical, surgical, family and social history were reviewed and updated in Epic as appropriate.       Review of Systems   HENT: Positive for congestion, postnasal drip, rhinorrhea and sinus pressure.    Respiratory: Positive for apnea.    Neurological: Positive for headaches.   Psychiatric/Behavioral: Positive for sleep disturbance.   All other systems reviewed and are negative.        Objective:    Physical Exam   Constitutional: She is oriented to person, place, and time. She appears well-developed and well-nourished.   HENT:  "  Head: Normocephalic and atraumatic.   Mouth/Throat: Oropharynx is clear and moist.   Mallampati 2 anatomy   Eyes: Conjunctivae are normal.   Neck: Neck supple. No thyromegaly present.   Cardiovascular: Regular rhythm. Bradycardia present.   Pulmonary/Chest: Effort normal and breath sounds normal.   Lymphadenopathy:     She has no cervical adenopathy.   Neurological: She is alert and oriented to person, place, and time.   Skin: Skin is warm and dry.   Psychiatric: She has a normal mood and affect. Her behavior is normal. Judgment and thought content normal.   Nursing note and vitals reviewed.  BP 95/52   Pulse 56   Ht 162.6 cm (64.02\")   Wt 55.4 kg (122 lb 3.2 oz)   SpO2 96%   BMI 20.97 kg/m²     34/34 days of use.  Greater than 4 hour use 97.1%.  AHI 5.  90% pressure 9.8 cm H2O.    ASSESSMENT/PLAN    Myra was seen today for follow-up.    Diagnoses and all orders for this visit:    JASON (obstructive sleep apnea)  -     CPAP Therapy            1. Counseled patient regarding multimodal approach with healthy nutrition, healthy sleep, regular physical activity, social activities, counseling, and medications. Encouraged to practice lateral sleep position. Avoid alcohol and sedatives close to bedtime.  2. I have encouraged patient to call me if the sluggishness does not subside after her body adjusts to no sugar in her diet.  I will get her an appointment immediately.  Otherwise I have refilled her supplies ×1 year and I will see her back in one year.    I have reviewed the results of my evaluation and impression and discussed my recommendations in detail with the patient.      Signed by  SOHEILA Toussaint    January 15, 2019      CC: Akosua Caldwell MD          No ref. provider found      "

## 2019-01-15 NOTE — PATIENT INSTRUCTIONS

## 2019-11-14 ENCOUNTER — OFFICE VISIT (OUTPATIENT)
Dept: INTERNAL MEDICINE | Facility: CLINIC | Age: 54
End: 2019-11-14

## 2019-11-14 VITALS
BODY MASS INDEX: 22.2 KG/M2 | TEMPERATURE: 97 F | DIASTOLIC BLOOD PRESSURE: 80 MMHG | WEIGHT: 130 LBS | SYSTOLIC BLOOD PRESSURE: 120 MMHG | HEART RATE: 72 BPM | HEIGHT: 64 IN | RESPIRATION RATE: 20 BRPM

## 2019-11-14 DIAGNOSIS — J45.20 MILD INTERMITTENT ASTHMA WITHOUT COMPLICATION: ICD-10-CM

## 2019-11-14 DIAGNOSIS — Z12.31 ENCOUNTER FOR SCREENING MAMMOGRAM FOR BREAST CANCER: ICD-10-CM

## 2019-11-14 DIAGNOSIS — G43.709 CHRONIC MIGRAINE WITHOUT AURA WITHOUT STATUS MIGRAINOSUS, NOT INTRACTABLE: ICD-10-CM

## 2019-11-14 DIAGNOSIS — Z13.6 SCREENING FOR CARDIOVASCULAR CONDITION: ICD-10-CM

## 2019-11-14 DIAGNOSIS — Z78.0 MENOPAUSE: ICD-10-CM

## 2019-11-14 DIAGNOSIS — Z01.419 ENCOUNTER FOR CERVICAL PAP SMEAR WITH PELVIC EXAM: ICD-10-CM

## 2019-11-14 DIAGNOSIS — R41.840 LACK OF CONCENTRATION: ICD-10-CM

## 2019-11-14 DIAGNOSIS — Z23 NEED FOR IMMUNIZATION AGAINST INFLUENZA: ICD-10-CM

## 2019-11-14 DIAGNOSIS — G47.33 OSA (OBSTRUCTIVE SLEEP APNEA): ICD-10-CM

## 2019-11-14 DIAGNOSIS — Z00.00 ENCOUNTER FOR HEALTH MAINTENANCE EXAMINATION IN ADULT: Primary | ICD-10-CM

## 2019-11-14 DIAGNOSIS — J30.89 SEASONAL ALLERGIC RHINITIS DUE TO OTHER ALLERGIC TRIGGER: ICD-10-CM

## 2019-11-14 PROCEDURE — 99396 PREV VISIT EST AGE 40-64: CPT | Performed by: INTERNAL MEDICINE

## 2019-11-14 PROCEDURE — 90686 IIV4 VACC NO PRSV 0.5 ML IM: CPT | Performed by: INTERNAL MEDICINE

## 2019-11-14 PROCEDURE — 90471 IMMUNIZATION ADMIN: CPT | Performed by: INTERNAL MEDICINE

## 2019-11-14 NOTE — PROGRESS NOTES
Subjective     Chief Complaint:  Physical Exam.    History of Present Illness    History obtained from the patient.    The patient did not return for fasting labs from 8/22/18.    The patient has a history of Asthma and Environmental Allergies, which are stable.     Symptoms:   Denies shortness of breath, ALVAREZ, wheezing, cough, and hemoptysis.    Medication: Albuterol inhaler as needed.  She does not take any medication for her allergies.     The patient has history of Migraine Headaches, which are stable.  Symptoms: migraine headache approximately once per month   Medication: Ibuprofen as needed, with rare Maxalt.    The patient has JASON, stable on CPAP.    The patient states she sees a Psychologist for counseling.  She was given an ADD questionaire to look at and she is wondering if she has ADD.  She complains of lack of focus / concentration, and procrastination. She has stable memory issues.  She denies hyperactivity, impulsivity, restlessness, insomnia, depression, and anxiety.    Myra Arshad is a 54 y.o. female who presents for an Annual Physical.        The patient presents for a Pap Smear. She is G [1 ] P [0 ]. AB [1 ]. She is Menopausal.  Her last menstrual period was [2016].   She [denies] dyspareunia. She [is not] sexually active.  Her last Pap smear was [8/22/18, normal, no ECC ]. She [denies] abnormal Pap Smears in the past. She [denies] sexually-transmitted diseases and HPV in the past. She [denies] JACOB Exposure in utero.      She [does] do not do self breast exam. She [denies) breast mass, breast pain, nipple inversion, or nipple discharge. Last Mammogram was [10/11/17-cat 1 ]. Last DEXA Scan was [None ]. Last Colonoscopy was [12/9/15, normal ].     Her MGM had Breast Cancer. She [denies] a family history of Colon Cancer, Colon Polyps, Uterine Cancer, Cervical Cancer, or Ovarian Cancer.      PMH, PSH, SocHx, FamHx, Allergies, and Medications: Reviewed and updated.    Outpatient Medications  Prior to Visit   Medication Sig Dispense Refill   • meloxicam (MOBIC) 15 MG tablet Take 1 tablet by mouth Daily. (Patient taking differently: Take 15 mg by mouth Daily As Needed.) 30 tablet 0   • rizatriptan MLT (MAXALT-MLT) 10 MG disintegrating tablet Take 1 tablet by mouth 1 (One) Time As Needed (for headache) for up to 1 dose. May repeat in 2 hours if needed 9 tablet 11   • albuterol (PROVENTIL HFA;VENTOLIN HFA) 108 (90 Base) MCG/ACT inhaler Inhale 2 puffs Every 4 (Four) Hours As Needed for Wheezing.       No facility-administered medications prior to visit.        Immunization History   Administered Date(s) Administered   • Flu Mist 11/10/2015   • Pneumococcal Polysaccharide (PPSV23) 2018   • Tdap 11/10/2015         Patient Active Problem List   Diagnosis   • Seasonal allergic rhinitis   • Asthma   • Menopause   • Migraine   • JASON (obstructive sleep apnea)       Health Habits:  Dental Exam. up to date  Eye Exam. not up to date - remote  Hearing Loss:  No  Exercise: 2-4 times/week.  Current exercise activities include: cardiovascular workout on exercise equipment  Diet: Healthy  Multivitamin: No    Safe Driving:  Yes  Seat Belt:  Yes  Bike Helmet:  N/A  Skin Screening:  Yes  Sunscreen: Yes  SBE / BESSY: No  Sexual Activity:  No  Birth Control:  Menopause  STD Prevention:  N/A    Last Pap: 16, normal (no ECC)  Last Mammogram:  10/11/17, cat 1  Last DEXA Scan: None  Last Colonoscopy: 12/9/15, normal  Last PSA: N/A    Social:    Social History     Socioeconomic History   • Marital status: Single     Spouse name: Not on file   • Number of children: 0   • Years of education: Not on file   • Highest education level: Not on file   Occupational History   • Occupation: Realtor     Comment: full time   Tobacco Use   • Smoking status: Former Smoker     Start date:      Last attempt to quit:      Years since quittin.8   • Smokeless tobacco: Never Used   • Tobacco comment: 1-2 ppd [id number] (Quit)    Substance and Sexual Activity   • Alcohol use: No     Comment: drank heavily approx [id number] (Quit)   • Drug use: No   • Sexual activity: Not Currently     Partners: Male     Birth control/protection: Post-menopausal         Current Medical Providers:    Akosua Caldwell MD (Internal Medicine / Pediatrics)    The [facility] providers who are involved in the care of this patient are listed above.         Review of Systems   Constitutional: Positive for fatigue and unexpected weight change (up 11 pounds since 8/22/18). Negative for chills and fever.        No night sweats.    HENT: Positive for congestion (mild). Negative for ear pain, hearing loss, nosebleeds, postnasal drip, rhinorrhea, sinus pressure, sinus pain, sneezing, sore throat, tinnitus and voice change.         Denies snoring.   Eyes: Positive for itching (watery). Negative for photophobia, pain, discharge, redness and visual disturbance.   Respiratory: Negative for cough, chest tightness, shortness of breath, wheezing and stridor.         No chest congestion.  No hemoptysis.   Cardiovascular: Negative for chest pain, palpitations and leg swelling.        No orthopnea, ALVAREZ, or PND.  No claudication or syncope.   Gastrointestinal: Negative for abdominal pain, blood in stool, constipation, diarrhea, nausea, rectal pain and vomiting.        No melena.  No hematemesis.  No heartburn, dysphagia or odynophagia.  No early satiety, belching, or bloating.    Endocrine: Negative for cold intolerance, heat intolerance, polydipsia, polyphagia and polyuria.        No hair loss or dry skin.  No hot flashes.     Genitourinary: Negative for difficulty urinating, dysuria, flank pain, frequency, hematuria, pelvic pain, urgency, vaginal bleeding and vaginal discharge.        No nocturia, incomplete emptying, or incontinence.   Musculoskeletal: Negative for arthralgias, back pain, gait problem, joint swelling, myalgias, neck pain and neck stiffness.        No joint  "stiffness.   Skin: Negative for rash.        No new skin lesions or changes in skin lesions. No breast pain or masses.  No nipple discharge or nipple inversion.   Neurological: Positive for headaches. Negative for dizziness, tremors, syncope, speech difficulty, weakness, light-headedness and numbness.        No tingling. Stable memory issues.   Hematological: Negative for adenopathy. Does not bruise/bleed easily.   Psychiatric/Behavioral: Positive for decreased concentration. Negative for confusion, sleep disturbance and suicidal ideas. The patient is not nervous/anxious.         No depression.           Objective     Vitals:    11/14/19 0852   BP: 120/80   BP Location: Right arm   Pulse: 72   Resp: 20   Temp: 97 °F (36.1 °C)   TempSrc: Temporal   Weight: 59 kg (130 lb)   Height: 162.6 cm (64\")       Body mass index is 22.31 kg/m².    Physical Exam   Constitutional: She appears well-developed and well-nourished.   HENT:   Head: Normocephalic and atraumatic.   Right Ear: Tympanic membrane, external ear and ear canal normal.   Left Ear: Tympanic membrane, external ear and ear canal normal.   Mouth/Throat: Oropharynx is clear and moist. No oral lesions.   Tonsils normal.   Eyes: Conjunctivae and EOM are normal. Pupils are equal, round, and reactive to light.   Neck: Normal range of motion. Neck supple. Carotid bruit is not present. No thyroid mass and no thyromegaly present.   Cardiovascular: Normal rate, regular rhythm and intact distal pulses. Exam reveals no gallop and no friction rub.   No murmur heard.  No peripheral edema.   Pulmonary/Chest: Effort normal and breath sounds normal. Right breast exhibits no inverted nipple, no mass, no nipple discharge, no skin change and no tenderness. Left breast exhibits no inverted nipple, no mass, no nipple discharge, no skin change and no tenderness.   Abdominal: Soft. Bowel sounds are normal. She exhibits no distension, no abdominal bruit and no mass. There is no " hepatosplenomegaly. There is no tenderness.   Normal rectal tone. No rectal masses.   Genitourinary: Vagina normal and uterus normal. Pelvic exam was performed with patient prone. There is no rash or lesion on the right labia. There is no rash or lesion on the left labia. Uterus is not tender. Cervix exhibits no motion tenderness, no discharge and no friability. Right adnexum displays no mass and no tenderness. Left adnexum displays no mass and no tenderness. No vaginal discharge found.   Genitourinary Comments: Cervix is without lesions, but there is mild erythema around the os.  No uterine masses.   Musculoskeletal: Normal range of motion. She exhibits no edema or tenderness.   Lymphadenopathy:     She has no cervical adenopathy.     She has no axillary adenopathy. No inguinal adenopathy noted on the right or left side.        Right: No inguinal and no supraclavicular adenopathy present.        Left: No inguinal and no supraclavicular adenopathy present.   Neurological: She is alert. She has normal strength and normal reflexes. No cranial nerve deficit. She exhibits normal muscle tone. Coordination and gait normal.   Skin: No lesion and no rash noted.   No atypical skin lesions.   Psychiatric: She has a normal mood and affect.   Nursing note and vitals reviewed.      PHQ-2 Depression Screening  Little interest or pleasure in doing things? 0   Feeling down, depressed, or hopeless? 0   PHQ-2 Total Score 0         Counseling was given to patient for the following topics:  appropriate exercise, healthy eating habits, disease prevention, risk factors for cancer, importance of self breast exam and breast health, importance of immunizations, including risks and benefits, bone health, sun safety, seatbelt use, safe driving and safe sex. Also discussed the importance of regular dental and vision care, as well recommendation for a yearly screening skin exam after age 40.  Written information provided to patient on these  topics and other health maintenance issues.      Assessment/Plan       Myra was seen today for annual exam.    Diagnoses and all orders for this visit:    Encounter for health maintenance examination in adult  -     Lipid Panel; Future  -     Comprehensive Metabolic Panel; Future  -     TSH; Future  -     Vitamin D 25 Hydroxy; Future  -     CBC & Differential; Future    Encounter for cervical Pap smear with pelvic exam  -     Liquid-based Pap Smear, Screening; Future    Mild intermittent asthma without complication   Continue current medication(s) as noted in the history of present illness.    Seasonal allergic rhinitis due to other allergic trigger   Stable, no medication.    Chronic migraine without aura without status migrainosus, not intractable   Continue current medication(s) as noted in the history of present illness.    JASON (obstructive sleep apnea)   Continue CPAP.    Menopause  -     DEXA Bone Density Axial; Future    Lack of concentration  -     Ambulatory Referral to Psychology    Screening for cardiovascular condition  -     Lipid Panel; Future  -     Comprehensive Metabolic Panel; Future  -     TSH; Future  -     Vitamin D 25 Hydroxy; Future  -     CBC & Differential; Future    Encounter for screening mammogram for breast cancer  -     Mammo Screening Digital Tomosynthesis Bilateral With CAD; Future    Need for immunization against influenza  -     Fluarix/Fluzone/Afluria Quad>6 Months    The patient declined a Hepatitis A vaccination today due to high deductible insurance.  I recommend Shingrix (new Shingles vaccine) and Hepatitis A vaccination at the pharmacy.    The patient agrees to do her labs at [facility].    Return in about 1 year (around 11/14/2020) for Annual physical, fasting.

## 2019-11-14 NOTE — PATIENT INSTRUCTIONS
Health Maintenance for Postmenopausal Women  Menopause is a normal process in which your reproductive ability comes to an end. This process happens gradually over a span of months to years, usually between the ages of 48 and 55. Menopause is complete when you have missed 12 consecutive menstrual periods.  It is important to talk with your health care provider about some of the most common conditions that affect postmenopausal women, such as heart disease, cancer, and bone loss (osteoporosis). Adopting a healthy lifestyle and getting preventive care can help to promote your health and wellness. Those actions can also lower your chances of developing some of these common conditions.  What should I know about menopause?  During menopause, you may experience a number of symptoms, such as:  · Moderate-to-severe hot flashes.  · Night sweats.  · Decrease in sex drive.  · Mood swings.  · Headaches.  · Tiredness.  · Irritability.  · Memory problems.  · Insomnia.  Choosing to treat or not to treat menopausal changes is an individual decision that you make with your health care provider.  What should I know about hormone replacement therapy and supplements?  Hormone therapy products are effective for treating symptoms that are associated with menopause, such as hot flashes and night sweats. Hormone replacement carries certain risks, especially as you become older. If you are thinking about using estrogen or estrogen with progestin treatments, discuss the benefits and risks with your health care provider.  What should I know about heart disease and stroke?  Heart disease, heart attack, and stroke become more likely as you age. This may be due, in part, to the hormonal changes that your body experiences during menopause. These can affect how your body processes dietary fats, triglycerides, and cholesterol. Heart attack and stroke are both medical emergencies.  There are many things that you can do to help prevent heart disease  and stroke:  · Have your blood pressure checked at least every 1-2 years. High blood pressure causes heart disease and increases the risk of stroke.  · If you are 55-79 years old, ask your health care provider if you should take aspirin to prevent a heart attack or a stroke.  · Do not use any tobacco products, including cigarettes, chewing tobacco, or electronic cigarettes. If you need help quitting, ask your health care provider.  · It is important to eat a healthy diet and maintain a healthy weight.  ? Be sure to include plenty of vegetables, fruits, low-fat dairy products, and lean protein.  ? Avoid eating foods that are high in solid fats, added sugars, or salt (sodium).  · Get regular exercise. This is one of the most important things that you can do for your health.  ? Try to exercise for at least 150 minutes each week. The type of exercise that you do should increase your heart rate and make you sweat. This is known as moderate-intensity exercise.  ? Try to do strengthening exercises at least twice each week. Do these in addition to the moderate-intensity exercise.  · Know your numbers. Ask your health care provider to check your cholesterol and your blood glucose. Continue to have your blood tested as directed by your health care provider.    What should I know about cancer screening?  There are several types of cancer. Take the following steps to reduce your risk and to catch any cancer development as early as possible.  Breast Cancer  · Practice breast self-awareness.  ? This means understanding how your breasts normally appear and feel.  ? It also means doing regular breast self-exams. Let your health care provider know about any changes, no matter how small.  · If you are 40 or older, have a clinician do a breast exam (clinical breast exam or CBE) every year. Depending on your age, family history, and medical history, it may be recommended that you also have a yearly breast X-ray (mammogram).  · If you  have a family history of breast cancer, talk with your health care provider about genetic screening.  · If you are at high risk for breast cancer, talk with your health care provider about having an MRI and a mammogram every year.  · Breast cancer (BRCA) gene test is recommended for women who have family members with BRCA-related cancers. Results of the assessment will determine the need for genetic counseling and BRCA1 and for BRCA2 testing. BRCA-related cancers include these types:  ? Breast. This occurs in males or females.  ? Ovarian.  ? Tubal. This may also be called fallopian tube cancer.  ? Cancer of the abdominal or pelvic lining (peritoneal cancer).  ? Prostate.  ? Pancreatic.  Cervical, Uterine, and Ovarian Cancer  Your health care provider may recommend that you be screened regularly for cancer of the pelvic organs. These include your ovaries, uterus, and vagina. This screening involves a pelvic exam, which includes checking for microscopic changes to the surface of your cervix (Pap test).  · For women ages 21-65, health care providers may recommend a pelvic exam and a Pap test every three years. For women ages 30-65, they may recommend the Pap test and pelvic exam, combined with testing for human papilloma virus (HPV), every five years. Some types of HPV increase your risk of cervical cancer. Testing for HPV may also be done on women of any age who have unclear Pap test results.  · Other health care providers may not recommend any screening for nonpregnant women who are considered low risk for pelvic cancer and have no symptoms. Ask your health care provider if a screening pelvic exam is right for you.  · If you have had past treatment for cervical cancer or a condition that could lead to cancer, you need Pap tests and screening for cancer for at least 20 years after your treatment. If Pap tests have been discontinued for you, your risk factors (such as having a new sexual partner) need to be reassessed  to determine if you should start having screenings again. Some women have medical problems that increase the chance of getting cervical cancer. In these cases, your health care provider may recommend that you have screening and Pap tests more often.  · If you have a family history of uterine cancer or ovarian cancer, talk with your health care provider about genetic screening.  · If you have vaginal bleeding after reaching menopause, tell your health care provider.  · There are currently no reliable tests available to screen for ovarian cancer.  Lung Cancer  Lung cancer screening is recommended for adults 55-80 years old who are at high risk for lung cancer because of a history of smoking. A yearly low-dose CT scan of the lungs is recommended if you:  · Currently smoke.  · Have a history of at least 30 pack-years of smoking and you currently smoke or have quit within the past 15 years. A pack-year is smoking an average of one pack of cigarettes per day for one year.  Yearly screening should:  · Continue until it has been 15 years since you quit.  · Stop if you develop a health problem that would prevent you from having lung cancer treatment.  Colorectal Cancer  · This type of cancer can be detected and can often be prevented.  · Routine colorectal cancer screening usually begins at age 50 and continues through age 75.  · If you have risk factors for colon cancer, your health care provider may recommend that you be screened at an earlier age.  · If you have a family history of colorectal cancer, talk with your health care provider about genetic screening.  · Your health care provider may also recommend using home test kits to check for hidden blood in your stool.  · A small camera at the end of a tube can be used to examine your colon directly (sigmoidoscopy or colonoscopy). This is done to check for the earliest forms of colorectal cancer.  · Direct examination of the colon should be repeated every 5-10 years until  age 75. However, if early forms of precancerous polyps or small growths are found or if you have a family history or genetic risk for colorectal cancer, you may need to be screened more often.  Skin Cancer  · Check your skin from head to toe regularly.  · Monitor any moles. Be sure to tell your health care provider:  ? About any new moles or changes in moles, especially if there is a change in a mole's shape or color.  ? If you have a mole that is larger than the size of a pencil eraser.  · If any of your family members has a history of skin cancer, especially at a young age, talk with your health care provider about genetic screening.  · Always use sunscreen. Apply sunscreen liberally and repeatedly throughout the day.  · Whenever you are outside, protect yourself by wearing long sleeves, pants, a wide-brimmed hat, and sunglasses.  What should I know about osteoporosis?  Osteoporosis is a condition in which bone destruction happens more quickly than new bone creation. After menopause, you may be at an increased risk for osteoporosis. To help prevent osteoporosis or the bone fractures that can happen because of osteoporosis, the following is recommended:  · If you are 19-50 years old, get at least 1,000 mg of calcium and at least 600 mg of vitamin D per day.  · If you are older than age 50 but younger than age 70, get at least 1,200 mg of calcium and at least 600 mg of vitamin D per day.  · If you are older than age 70, get at least 1,200 mg of calcium and at least 800 mg of vitamin D per day.  Smoking and excessive alcohol intake increase the risk of osteoporosis. Eat foods that are rich in calcium and vitamin D, and do weight-bearing exercises several times each week as directed by your health care provider.  What should I know about how menopause affects my mental health?  Depression may occur at any age, but it is more common as you become older. Common symptoms of depression include:  · Low or sad  mood.  · Changes in sleep patterns.  · Changes in appetite or eating patterns.  · Feeling an overall lack of motivation or enjoyment of activities that you previously enjoyed.  · Frequent crying spells.  Talk with your health care provider if you think that you are experiencing depression.  What should I know about immunizations?  It is important that you get and maintain your immunizations. These include:  · Tetanus, diphtheria, and pertussis (Tdap) booster vaccine.  · Influenza every year before the flu season begins.  · Pneumonia vaccine.  · Shingles vaccine.  Your health care provider may also recommend other immunizations.  This information is not intended to replace advice given to you by your health care provider. Make sure you discuss any questions you have with your health care provider.  Document Released: 02/09/2007 Document Revised: 07/07/2017 Document Reviewed: 09/20/2016  [employer] Interactive Patient Education © 2019 [employer]

## 2020-01-07 ENCOUNTER — TELEPHONE (OUTPATIENT)
Dept: INTERNAL MEDICINE | Facility: CLINIC | Age: 55
End: 2020-01-07

## 2020-01-07 NOTE — TELEPHONE ENCOUNTER
This is still my patient.  The appointment with the APRN is for her annual sleep medicine appointment.  When I saw the patient for her physical, she was not sure how much the labs were going to cost with her new insurance.  She may have decided not to do them.  Can we send a letter or call her to confirm if she is planning on doing these or not

## 2020-01-07 NOTE — TELEPHONE ENCOUNTER
----- Message from Liz Cai sent at 1/6/2020  2:33 PM EST -----  Are you still seeing this patient ?? I see that she has appointments with another APRN.  She also has pending labs ordered from you back in November do you still want these I have looked in [facility] she has not had them completed.     ----- Message -----  From: SYSTEM  Sent: 1/6/2020  12:16 AM EST  To: [facility] Menjiv[facility]

## 2020-01-13 DIAGNOSIS — G44.219 EPISODIC TENSION-TYPE HEADACHE, NOT INTRACTABLE: ICD-10-CM

## 2020-01-13 RX ORDER — RIZATRIPTAN BENZOATE 10 MG/1
10 TABLET, ORALLY DISINTEGRATING ORAL ONCE AS NEEDED
Qty: 9 TABLET | Refills: 11 | OUTPATIENT
Start: 2020-01-13 | End: 2020-08-22

## 2020-01-13 NOTE — TELEPHONE ENCOUNTER
Pt. Called requesting refill on the following rizatriptan MLT (MAXALT-MLT) 10 MG disintegrating tablet. Patient states headaches are becoming more frequent.    Patient phone number [phone]    [facility] confirmed.

## 2020-03-13 ENCOUNTER — TELEPHONE (OUTPATIENT)
Dept: INTERNAL MEDICINE | Facility: CLINIC | Age: 55
End: 2020-03-13

## 2020-03-13 NOTE — TELEPHONE ENCOUNTER
----- Message from Liz Cai sent at 3/13/2020  2:01 PM EDT -----  3-13-20  Patient still has a mammogram and a dexa scan in pending from 11-14-19.  Do you want to d/c and talk at next appointment ??    ----- Message -----  From: SYSTEM  Sent: 3/8/2020  12:16 AM EDT  To: David Menjivar [facility]

## 2020-07-19 PROCEDURE — U0003 INFECTIOUS AGENT DETECTION BY NUCLEIC ACID (DNA OR RNA); SEVERE ACUTE RESPIRATORY SYNDROME CORONAVIRUS 2 (SARS-COV-2) (CORONAVIRUS DISEASE [COVID-19]), AMPLIFIED PROBE TECHNIQUE, MAKING USE OF HIGH THROUGHPUT TECHNOLOGIES AS DESCRIBED BY CMS-2020-01-R: HCPCS | Performed by: NURSE PRACTITIONER

## 2020-07-22 ENCOUNTER — TELEPHONE (OUTPATIENT)
Dept: URGENT CARE | Facility: CLINIC | Age: 55
End: 2020-07-22

## 2020-08-28 ENCOUNTER — OFFICE VISIT (OUTPATIENT)
Dept: INTERNAL MEDICINE | Facility: CLINIC | Age: 55
End: 2020-08-28

## 2020-08-28 VITALS
RESPIRATION RATE: 20 BRPM | HEART RATE: 60 BPM | BODY MASS INDEX: 19.95 KG/M2 | SYSTOLIC BLOOD PRESSURE: 106 MMHG | TEMPERATURE: 98.2 F | WEIGHT: 116.25 LBS | DIASTOLIC BLOOD PRESSURE: 68 MMHG

## 2020-08-28 DIAGNOSIS — J02.9 ACUTE PHARYNGITIS, UNSPECIFIED ETIOLOGY: Primary | ICD-10-CM

## 2020-08-28 PROCEDURE — 99214 OFFICE O/P EST MOD 30 MIN: CPT | Performed by: INTERNAL MEDICINE

## 2020-08-28 RX ORDER — AMOXICILLIN 875 MG/1
875 TABLET, COATED ORAL 2 TIMES DAILY
Qty: 20 TABLET | Refills: 0 | Status: SHIPPED | OUTPATIENT
Start: 2020-08-28 | End: 2020-09-07

## 2020-08-28 RX ORDER — TRIAMCINOLONE ACETONIDE 0.1 %
PASTE (GRAM) DENTAL 3 TIMES DAILY
Qty: 5 G | Refills: 0 | Status: SHIPPED | OUTPATIENT
Start: 2020-08-28 | End: 2020-09-04

## 2020-08-28 NOTE — PROGRESS NOTES
Yanira Arshad is a 55 y.o. female.     Chief Complaint   Patient presents with   • Mouth Lesions       History obtained from the patient and the chart.    The patient states she initially noticed a lesion in the left back part of her mouth 3 weeks ago.  She saw her dentist and was told to monitor.  One week ago she developed lesions at the back of her throat.  She was seen at [facility] on 8/22/2020 and diagnosed with aphthous ulcers.  Records have been received and reviewed.  Triamcinolone dental paste was prescribed.  She denies recent oral sex.      Mouth Lesions    Episode onset: 1 week ago. The onset was gradual. The problem has been gradually improving. The problem is mild. Relieved by: Triamcinalone dental paste. Exacerbated by: spicy foods. Associated symptoms include mouth sores. Pertinent negatives include no fever, no eye itching, no abdominal pain, no diarrhea, no nausea, no vomiting, no congestion, no ear discharge, no ear pain, no headaches, no rhinorrhea, no sore throat, no swollen glands, no cough, no wheezing, no rash, no eye discharge, no eye pain and no eye redness. She has been eating and drinking normally. There were no sick contacts. Recent Medical Care: As above.        The following portions of the patient's history were reviewed and updated as appropriate: allergies, current medications, past family history, past medical history, past social history, past surgical history and problem list.      Review of Systems   Constitutional: Negative for appetite change, chills, fatigue and fever.   HENT: Positive for mouth sores. Negative for congestion, ear discharge, ear pain, postnasal drip, rhinorrhea, sinus pressure, sinus pain, sore throat and voice change.    Eyes: Negative for pain, discharge, redness and itching.   Respiratory: Negative for cough and wheezing.    Cardiovascular: Negative for chest pain.   Gastrointestinal: Negative for abdominal pain, diarrhea, nausea and vomiting.    Musculoskeletal: Negative for arthralgias, joint swelling and myalgias.   Skin: Negative for rash.   Neurological: Negative for headaches.   Hematological: Negative for adenopathy.           Objective     Blood pressure 106/68, pulse 60, temperature 98.2 °F (36.8 °C), temperature source Temporal, resp. rate 20, weight 52.7 kg (116 lb 4 oz).    Physical Exam   Constitutional: She appears well-developed and well-nourished.   HENT:   Head: Normocephalic and atraumatic.   Right Ear: Tympanic membrane, external ear and ear canal normal.   Left Ear: Tympanic membrane, external ear and ear canal normal.   Mouth/Throat: Oropharynx is clear and moist and mucous membranes are normal. Oral lesions (no ulcers, but there are posterior pharynx erythematous papular lesions) present.   Tonsils normal.  No sinus tenderness to palpation.   Eyes: Conjunctivae are normal.   Neck: Normal range of motion. Neck supple.   Cardiovascular: Normal rate and regular rhythm.   No murmur heard.  Pulmonary/Chest: Effort normal and breath sounds normal.   Lymphadenopathy:     She has no cervical adenopathy.   Skin: No rash noted.   Psychiatric: She has a normal mood and affect.   Nursing note and vitals reviewed.        Assessment/Plan   Myra was seen today for mouth lesions.    Diagnoses and all orders for this visit:    Acute pharyngitis, unspecified etiology  -     amoxicillin (AMOXIL) 875 MG tablet; Take 1 tablet by mouth 2 (Two) Times a Day for 10 days (empiric treatment).  -     triamcinolone (KENALOG) 0.1 % paste; Apply  to teeth 3 (Three) Times a Day for 7 days (refill).      Recommended salt water gargles.  Continue the Triamcinalone dental paste      Return if symptoms worsen or fail to improve.

## 2020-09-22 ENCOUNTER — TELEPHONE (OUTPATIENT)
Dept: INTERNAL MEDICINE | Facility: CLINIC | Age: 55
End: 2020-09-22

## 2020-09-22 DIAGNOSIS — J31.2 CHRONIC PHARYNGITIS: Primary | ICD-10-CM

## 2020-09-22 NOTE — TELEPHONE ENCOUNTER
PATIENT CALLED AND STATED THAT HER THROAT IS NOT BETTER AFTER ANTIBIOTICS.  STILL HAS BUMPS ON BACK OF THROAT AND IS STILL SORE.  DR. BLACK TOLD PATIENT IF NOT BETTER TO CALL AND WOULD REFER TO ENT DOCTOR.      PLEASE CALL AND ADVISE: [phone]

## 2020-10-06 PROBLEM — E04.2 MULTIPLE THYROID NODULES: Status: ACTIVE | Noted: 2020-10-06

## 2021-06-02 DIAGNOSIS — E04.2 MULTIPLE THYROID NODULES: Primary | ICD-10-CM

## 2022-02-03 PROCEDURE — U0004 COV-19 TEST NON-CDC HGH THRU: HCPCS | Performed by: NURSE PRACTITIONER

## 2022-06-30 ENCOUNTER — TELEPHONE (OUTPATIENT)
Dept: INTERNAL MEDICINE | Facility: CLINIC | Age: 57
End: 2022-06-30

## 2022-06-30 NOTE — TELEPHONE ENCOUNTER
Caller: Myra Arshad    Relationship: Self    Best call back number: [phone]    What is the medical concern/diagnosis: HIP PAIN    What specialty or service is being requested: ORTHOPEDIC    What is the provider, practice or medical service name: NA    What is the office location: [facility]    What is the office phone number:     Any additional details:

## 2022-06-30 NOTE — TELEPHONE ENCOUNTER
Please get more information on this.      Since I have not seen her in close to 2 years, she most likely will need an appointment with me for the hip problem or for her physical (w/fasting labs) before I can place any referrals.

## 2022-06-30 NOTE — TELEPHONE ENCOUNTER
Spoke with pt and scheduled appointment for 07.06.22.    Instructed to fast prior to appointment for labwork.    Pt verbalized understanding, agreement, and appreciation.

## 2022-07-06 ENCOUNTER — HOSPITAL ENCOUNTER (OUTPATIENT)
Dept: GENERAL RADIOLOGY | Facility: HOSPITAL | Age: 57
Discharge: HOME | End: 2022-07-06
Admitting: INTERNAL MEDICINE

## 2022-07-06 ENCOUNTER — OFFICE VISIT (OUTPATIENT)
Dept: INTERNAL MEDICINE | Facility: CLINIC | Age: 57
End: 2022-07-06

## 2022-07-06 VITALS
DIASTOLIC BLOOD PRESSURE: 70 MMHG | WEIGHT: 128 LBS | TEMPERATURE: 98.2 F | RESPIRATION RATE: 18 BRPM | HEART RATE: 78 BPM | BODY MASS INDEX: 21.97 KG/M2 | SYSTOLIC BLOOD PRESSURE: 110 MMHG

## 2022-07-06 DIAGNOSIS — M25.552 LEFT HIP PAIN: Primary | ICD-10-CM

## 2022-07-06 PROCEDURE — 99213 OFFICE O/P EST LOW 20 MIN: CPT | Performed by: INTERNAL MEDICINE

## 2022-07-06 PROCEDURE — 73502 X-RAY EXAM HIP UNI 2-3 VIEWS: CPT

## 2022-07-06 RX ORDER — OMEGA-3/DHA/EPA/FISH OIL 300-1000MG
CAPSULE ORAL DAILY
COMMUNITY

## 2022-07-06 RX ORDER — MELOXICAM 15 MG/1
15 TABLET ORAL DAILY PRN
Qty: 30 TABLET | Refills: 2 | Status: SHIPPED | OUTPATIENT
Start: 2022-07-06 | End: 2022-08-15 | Stop reason: ALTCHOICE

## 2022-07-06 NOTE — PROGRESS NOTES
Subjective       Myra Arshad is a 57 y.o. female.     Chief Complaint   Patient presents with   • Hip Pain     Left hip   after running on beach        History obtained from the patient.      History of Present Illness     The patient reports she believes she is having the same problem with her left hip in which she had 5 years prior. She notes she previously had an MRI done by Dr. Duke whom is now retired. The patient states she believes the MRI showed a torn ligament. She adds she was told she could not have surgery on it and to do physical therapy. The patient was advised what to do and what not to do. She notes the hip got better but now it feels like the same thing. The patient is unsure if the hip was ever completely better. She reports her hip has been bothering her for the last 4 to 5 weeks. The patient denies any knowledge of a particular injury, trauma, or overuse activity which might have caused it. She notes it is getting better since it started again because she has not done the elliptical. The patient adds she has started doing home physical therapy again but it throbs when she sits down. She states the only time it is not hurting is when she is walking in which after a while it will but she can squeeze her butt cheeks and do a couple of deep bends and will not feel it. The patient notes she can make a certain movement and want to hit the ground. She reports when it first happened she was unable to balance completely on her left leg or step up at all without holding on. The patient denies it is her back. She notes the pain goes down to her left thigh. The patient is not taking any pain medications, she applies ice and heat when it is really bad. She notes she had a bursal injection on 09/09/2016 but they only gave her so much. The patient was seen 12/21/2016 where she was better but wanted more treatment so she was given another injection and told to come back as needed. She believes she had a  x-ray at her first visit and a MRI after the second injection.    Per [facility] records, previous left hip x-ray showed mild arthritis.    The following portions of the patient's history were reviewed and updated as appropriate: allergies, current medications, past family history, past medical history, past social history, past surgical history and problem list.      Review of Systems   Constitutional: Negative for chills and fever.   Respiratory: Negative for shortness of breath.    Cardiovascular: Negative for chest pain.   Gastrointestinal: Negative for abdominal pain, blood in stool, constipation, diarrhea, nausea and vomiting.        Denies melena   Genitourinary: Negative for dysuria, frequency, hematuria, pelvic pain and urgency.   Musculoskeletal: Negative for arthralgias, back pain, gait problem, joint swelling and myalgias.   Skin: Negative for rash.   Hematological: Negative for adenopathy.           Objective     Blood pressure 110/70, pulse 78, temperature 98.2 °F (36.8 °C), temperature source Temporal, resp. rate 18, weight 58.1 kg (128 lb).    Physical Exam  Vitals and nursing note reviewed.   Constitutional:       Appearance: She is well-developed and normal weight.   Neck:      Comments: There is no tenderness to palpation of the cervical spine or paraspinal muscles.  Cardiovascular:      Rate and Rhythm: Normal rate and regular rhythm.      Heart sounds: Normal heart sounds. No murmur heard.  Pulmonary:      Effort: Pulmonary effort is normal.      Breath sounds: Normal breath sounds.   Abdominal:      General: Bowel sounds are normal. There is no distension.      Palpations: Abdomen is soft. There is no hepatomegaly, splenomegaly or mass.      Tenderness: There is no abdominal tenderness. There is no right CVA tenderness or left CVA tenderness.      Comments: No CVA tenderness.   Musculoskeletal:         General: Normal range of motion.      Cervical back: Normal range of motion and neck  supple.      Comments: There is no tenderness to palpation over the lumbar or thoracic spine or paraspinal muscles.  Straight leg raise is negative bilaterally.  There is no pain with right hip flexion, extension, abduction, and adduction.  There is pain with left hip flexion, but not extension.  There is pain with left hip abduction, but not adduction.  There is no erythema, edema, warmth, or tenderness to palpation of the left hip.   Skin:     Findings: No rash.   Neurological:      Mental Status: She is alert.      Motor: Motor function is intact.      Gait: Gait is intact.      Deep Tendon Reflexes: Reflexes are normal and symmetric.   Psychiatric:         Mood and Affect: Mood normal.           Assessment & Plan   Diagnoses and all orders for this visit:    1. Left hip pain (Primary)  -     XR Hip With or Without Pelvis 2 - 3 View Left  -     Ambulatory Referral to Orthopedic Surgery  -     meloxicam (MOBIC) 15 MG tablet; Take 1 tablet by mouth Daily As Needed for Moderate Pain .  Dispense: 30 tablet; Refill: 2     Continue ice.   The patient was instructed to not take any Ibuprofen or Aleve with the meloxicam. The patient can take Tylenol.   May need an MRI.      Return if symptoms worsen or fail to improve.        Transcribed from ambient dictation for Akosua Caldwell MD by Saleem Theodore.  07/06/22   15:27 EDT    Patient verbalized consent to the visit recording.  I have personally performed the services described in this document as transcribed by the above individual, and it is both accurate and complete.  Akosua Caldwell MD  7/17/2022  15:35 EDT

## 2022-07-14 ENCOUNTER — OFFICE VISIT (OUTPATIENT)
Dept: ORTHOPEDIC SURGERY | Facility: CLINIC | Age: 57
End: 2022-07-14

## 2022-07-14 VITALS
SYSTOLIC BLOOD PRESSURE: 112 MMHG | WEIGHT: 128.09 LBS | DIASTOLIC BLOOD PRESSURE: 74 MMHG | HEIGHT: 64 IN | BODY MASS INDEX: 21.87 KG/M2

## 2022-07-14 DIAGNOSIS — M16.12 PRIMARY OSTEOARTHRITIS OF LEFT HIP: Primary | ICD-10-CM

## 2022-07-14 DIAGNOSIS — M70.62 TROCHANTERIC BURSITIS OF LEFT HIP: ICD-10-CM

## 2022-07-14 DIAGNOSIS — G57.02 PIRIFORMIS SYNDROME, LEFT: ICD-10-CM

## 2022-07-14 PROCEDURE — 99244 OFF/OP CNSLTJ NEW/EST MOD 40: CPT | Performed by: ORTHOPAEDIC SURGERY

## 2022-07-14 RX ORDER — DICLOFENAC SODIUM 75 MG/1
75 TABLET, DELAYED RELEASE ORAL 2 TIMES DAILY
Qty: 60 TABLET | Refills: 1 | Status: SHIPPED | OUTPATIENT
Start: 2022-07-14 | End: 2022-08-15

## 2022-07-14 NOTE — PROGRESS NOTES
Orthopaedic Clinic Note: Hip New Patient    Chief Complaint   Patient presents with   • Left Hip - Pain        HPI  Consult from: Akosua Caldwell MD    Myra Arshad is a 57 y.o. female who presents with left hip pain for 3 month(s). Onset atraumatic and gradual in nature. Pain is localized to groin, lateral trochanter and gluteal region and is a 1/10 on the pain scale.Pain is described as dull, aching, throbbing and stabbing. Associated symptoms include pain, stiffness and giving way/buckling.  The pain is worse with certain movements; ice and heat improve the pain. Previous treatments have included: NSAIDS and physical therapy since symptom onset. Although some transient relief was reported with these interventions, these conservative measures have failed and symptoms have persisted. The patient is limited in daily activities and has had a significant decrease in quality of life as a result. She denies fevers, chills, or constitutional symptoms.    I have reviewed the following portions of the patient's history:History of Present Illness    Past Medical History:   Diagnosis Date   • Asthma     Description: adult onset   • History of varicella    • Menopause 5/20/2016   • Multiple thyroid nodules     Dx 10/2/20 by u/s-complex lesion right lobe (biopsy recommended), solid nodule left lobe, and cyst left lobe (Dr. Orozco).   • JASON (obstructive sleep apnea)     Dx 9/18- mild   • Seasonal allergic rhinitis       Past Surgical History:   Procedure Laterality Date   • NO PAST SURGERIES     • THYROID BIOPSY Right 10/08/2020    Ultrasound guided      Family History   Problem Relation Age of Onset   • Hyperlipidemia Mother    • Osteoarthritis Mother    • Depression Father         ? Bipolar   • Diabetes Father    • Hyperlipidemia Father    • Alzheimer's disease Father    • Hyperthyroidism Sister    • ADD / ADHD Brother    • Aortic aneurysm Maternal Grandfather         abdominal aortic aneurysm   • Coronary artery disease  "Paternal Grandmother    • Coronary artery disease Paternal Grandfather    • Breast cancer Maternal Aunt    • Ovarian cancer Neg Hx      Social History     Socioeconomic History   • Marital status: Single   • Number of children: 0   Tobacco Use   • Smoking status: Former Smoker     Start date:      Quit date:      Years since quittin.5   • Smokeless tobacco: Never Used   • Tobacco comment: 1-2 ppd 0035-3023 (Quit)   Substance and Sexual Activity   • Alcohol use: No     Comment: drank heavily approx [id number] (Quit)   • Drug use: No   • Sexual activity: Not Currently     Partners: Male     Birth control/protection: Post-menopausal      Current Outpatient Medications on File Prior to Visit   Medication Sig Dispense Refill   • Coenzyme Q10 10 MG capsule Take  by mouth As Needed.     • fish oil-omega-3 fatty acids 1000 MG capsule Take  by mouth Daily.     • MAGNESIUM PO Take 1 tablet by mouth Daily.     • meloxicam (MOBIC) 15 MG tablet Take 1 tablet by mouth Daily As Needed for Moderate Pain . 30 tablet 2     No current facility-administered medications on file prior to visit.      No Known Allergies     Review of Systems   Constitutional: Negative.    HENT: Negative.    Eyes: Negative.    Respiratory: Negative.    Cardiovascular: Negative.    Gastrointestinal: Negative.    Endocrine: Negative.    Genitourinary: Negative.    Musculoskeletal: Positive for arthralgias.   Skin: Negative.    Allergic/Immunologic: Negative.    Neurological: Negative.    Hematological: Negative.    Psychiatric/Behavioral: Negative.         The patient's Review of Systems was personally reviewed and confirmed as accurate.    The following portions of the patient's history were reviewed and updated as appropriate: allergies, current medications, past family history, past medical history, past social history, past surgical history and problem list.    Physical Exam  Blood pressure 112/74, height 162.6 cm (64.02\"), weight 58.1 kg (128 " lb 1.4 oz).    Body mass index is 21.98 kg/m².    GENERAL APPEARANCE: awake, alert & oriented x 3, in no acute distress and well developed, well nourished  PSYCH: normal affect  LUNGS:  breathing nonlabored  EYES: sclera anicteric  CARDIOVASCULAR: palpable dorsalis pedis, palpable posterior tibial bilaterally. Capillary refill less than 2 seconds  EXTREMITIES: no clubbing, cyanosis  GAIT:  Normal           Right Hip Exam:  RANGE OF MOTION:   FLEXION CONTRACTURE: None   FLEXION: 110 degrees   INTERNAL ROTATION: 20 degrees at 90 degrees of flexion   EXTERNAL ROTATION: 40 degrees at 90 degrees of flexion    PAIN WITH HIP MOTION: no  PAIN WITH LOGROLL: no  [place]CHFIELD TEST: negative    KNEE EXAM: full knee ROM (0-120 degrees), stable to varus and valgus stress at terminal extension and 30 degrees flexion     STRENGTH:  5/5 hip adduction, abduction, flexion. 5/5 strength knee flexion, extension. 5/5 strength ankle dorsiflexion and plantarflexion.     GREATER TROCHANTER BURSAL PAIN:  no     REFLEXES:   PATELLAR 2+/4   ACHILLES 2+/4    CLONUS: negative  STRAIGHT LEG TEST:   negative    SENSATION TO LIGHT TOUCH:  DEEP PERONEAL/SUPERFICIAL PERONEAL/SURAL/SAPHENOUS/TIBIAL:  intact    EDEMA:   no  ERYTHEMA:  no  WOUNDS/INCISIONS: no overlying skin problems.      Left Hip Exam:   RANGE OF MOTION:   FLEXION CONTRACTURE: None   FLEXION: 110 degrees   INTERNAL ROTATION: 20 degrees at 90 degrees of flexion   EXTERNAL ROTATION: 40 degrees at 90 degrees of flexion    PAIN WITH HIP MOTION: no  PAIN WITH LOGROLL: no  STINCHFIELD TEST: negative    KNEE EXAM: full knee ROM (0-120 degrees), stable to varus and valgus stress at terminal extension and 30 degrees flexion     STRENGTH:  5/5 hip adduction, abduction, flexion. 5/5 strength knee flexion, extension. 5/5 strength ankle dorsiflexion and plantarflexion.     GREATER TROCHANTER BURSAL PAIN:  Yes  Tender to palpation gluteal and piriformis muscle     REFLEXES:   PATELLAR 2+/4    ACHILLES 2+/4    CLONUS: negative  STRAIGHT LEG TEST:   negative    SENSATION TO LIGHT TOUCH:  DEEP PERONEAL/SUPERFICIAL PERONEAL/SURAL/SAPHENOUS/TIBIAL:  intact    EDEMA:   no  ERYTHEMA:  no  WOUNDS/INCISIONS: no overlying skin problems.      ------------------------------------------------------------------    LEG LENGTHS:  equal  _____________________________________________________  _____________________________________________________    RADIOGRAPHIC FINDINGS:   AP pelvis and left hip radiographs from 7/6/2022 were personally interpreted.  Radiographs demonstrate moderate bilateral hip arthritis with slight joint space narrowing and peritubular osteophyte formation.  No acute bony injury or fracture.    Assessment/Plan:   Diagnosis Plan   1. Primary osteoarthritis of left hip  diclofenac (VOLTAREN) 75 MG EC tablet   2. Piriformis syndrome, left  diclofenac (VOLTAREN) 75 MG EC tablet   3. Trochanteric bursitis of left hip  diclofenac (VOLTAREN) 75 MG EC tablet     Patient has mild to moderate arthritis of the left hip that is minimally symptomatic on exam today.  She is also experiencing trochanteric bursitis as well as piriformis syndrome.  I discussed treatment options with her.  I explained that her hip range of motion does not elicit specific pain today and therefore I am not entirely certain that hip arthritis is the sole source of her pain.  I recommended a home stretching regimen.  I will transition her from meloxicam to enteric-coated diclofenac as she is unable to tolerate the meloxicam.  Follow-up in 2 weeks.    Prince Montgomery MD  07/14/22  10:23 EDT

## 2022-07-28 ENCOUNTER — OFFICE VISIT (OUTPATIENT)
Dept: ORTHOPEDIC SURGERY | Facility: CLINIC | Age: 57
End: 2022-07-28

## 2022-07-28 VITALS
WEIGHT: 128.09 LBS | BODY MASS INDEX: 21.87 KG/M2 | HEIGHT: 64 IN | SYSTOLIC BLOOD PRESSURE: 108 MMHG | DIASTOLIC BLOOD PRESSURE: 70 MMHG

## 2022-07-28 DIAGNOSIS — M16.12 PRIMARY OSTEOARTHRITIS OF LEFT HIP: Primary | ICD-10-CM

## 2022-07-28 DIAGNOSIS — G57.02 PIRIFORMIS SYNDROME, LEFT: ICD-10-CM

## 2022-07-28 PROCEDURE — 99212 OFFICE O/P EST SF 10 MIN: CPT | Performed by: ORTHOPAEDIC SURGERY

## 2022-07-28 NOTE — PROGRESS NOTES
Orthopaedic Clinic Note: Hip Established Patient    Chief Complaint   Patient presents with   • Follow-up     2 week f/u Primary osteoarthritis of left hip         HPI    It has been 2  week(s) since Ms. Arshad's last visit. She returns to clinic today for follow-up left hip pain.  She underwent treatment with prescription anti-inflammatory last visit.  She returns to clinic today rating her pain a 1/10 on the pain scale.  She is ambulating with no assistive device.  Denies fevers chills or constitutional symptoms.  Overall she is happy with her outcome and symptom improvement.    Past Medical History:   Diagnosis Date   • Asthma     Description: adult onset   • History of varicella    • Menopause 2016   • Multiple thyroid nodules     Dx 10/2/20 by u/s-complex lesion right lobe (biopsy recommended), solid nodule left lobe, and cyst left lobe (Dr. Orozco).   • JASON (obstructive sleep apnea)     Dx - mild   • Seasonal allergic rhinitis       Past Surgical History:   Procedure Laterality Date   • NO PAST SURGERIES     • THYROID BIOPSY Right 10/08/2020    Ultrasound guided      Family History   Problem Relation Age of Onset   • Hyperlipidemia Mother    • Osteoarthritis Mother    • Depression Father         ? Bipolar   • Diabetes Father    • Hyperlipidemia Father    • Alzheimer's disease Father    • Hyperthyroidism Sister    • ADD / ADHD Brother    • Aortic aneurysm Maternal Grandfather         abdominal aortic aneurysm   • Coronary artery disease Paternal Grandmother    • Coronary artery disease Paternal Grandfather    • Breast cancer Maternal Aunt    • Ovarian cancer Neg Hx      Social History     Socioeconomic History   • Marital status: Single   • Number of children: 0   Tobacco Use   • Smoking status: Former Smoker     Start date:      Quit date: 2003     Years since quittin.5   • Smokeless tobacco: Never Used   • Tobacco comment: 1-2 ppd [id number] (Quit)   Vaping Use   • Vaping Use: Never used  "  Substance and Sexual Activity   • Alcohol use: No     Comment: drank heavily approx [id number] (Quit)   • Drug use: No   • Sexual activity: Not Currently     Partners: Male     Birth control/protection: Post-menopausal      Current Outpatient Medications on File Prior to Visit   Medication Sig Dispense Refill   • Coenzyme Q10 10 MG capsule Take  by mouth As Needed.     • diclofenac (VOLTAREN) 75 MG EC tablet Take 1 tablet by mouth 2 (Two) Times a Day. 60 tablet 1   • fish oil-omega-3 fatty acids 1000 MG capsule Take  by mouth Daily.     • MAGNESIUM PO Take 1 tablet by mouth Daily.     • meloxicam (MOBIC) 15 MG tablet Take 1 tablet by mouth Daily As Needed for Moderate Pain . 30 tablet 2     No current facility-administered medications on file prior to visit.      No Known Allergies     Review of Systems   Constitutional: Negative.    HENT: Negative.    Eyes: Negative.    Respiratory: Negative.    Cardiovascular: Negative.    Gastrointestinal: Negative.    Endocrine: Negative.    Genitourinary: Negative.    Musculoskeletal: Positive for arthralgias.   Skin: Negative.    Allergic/Immunologic: Negative.    Neurological: Negative.    Hematological: Negative.    Psychiatric/Behavioral: Negative.         The patient's Review of Systems was personally reviewed and confirmed as accurate.    Physical Exam  Blood pressure 108/70, height 162.6 cm (64.02\"), weight 58.1 kg (128 lb 1.4 oz).    Body mass index is 21.98 kg/m².    GENERAL APPEARANCE: awake, alert, oriented, in no acute distress and well developed, well nourished  LUNGS:  breathing nonlabored  EXTREMITIES: no clubbing, cyanosis  PERIPHERAL PULSES: palpable dorsalis pedis and posterior tibial pulses bilaterally.    GAIT:  Normal            Hip Exam:  Left    RANGE OF MOTION:  EXTENSION/FLEXION:  normal (0-110 degrees)  IR (at 90 degrees of flexion):  20  ER (at 90 degrees of flexion):  40  PAIN WITH HIP MOTION:  no  PAIN WITH LOGROLL:  no     [facility] TEST: " negative    STRENGTH:  ABDUCTOR:  5/5  ADDUCTOR:  5/5  HIP FLEXION:  5/5    GREATER TROCHANTER BURSAL PAIN:  no    SENSATION TO LIGHT TOUCH:  DEEP PERONEAL/SUPERFICIAL PERONEAL/SURAL/SAPHENOUS/TIBIAL:   intact    EDEMA:  no  ERYTHEMA:  no  WOUNDS/INCISIONS:   no  _________________________________________________________________  _________________________________________________________________    RADIOGRAPHIC FINDINGS:   No new imaging today    Assessment/Plan:   Diagnosis Plan   1. Primary osteoarthritis of left hip     2. Piriformis syndrome, left       Patient symptoms are greatly improved.  No further intervention warranted at this time.  Patient follow-up as needed.    Prince Montgomery MD  07/28/22  09:15 EDT

## 2022-08-14 DIAGNOSIS — G57.02 PIRIFORMIS SYNDROME, LEFT: ICD-10-CM

## 2022-08-14 DIAGNOSIS — M70.62 TROCHANTERIC BURSITIS OF LEFT HIP: ICD-10-CM

## 2022-08-14 DIAGNOSIS — M16.12 PRIMARY OSTEOARTHRITIS OF LEFT HIP: ICD-10-CM

## 2022-08-15 RX ORDER — DICLOFENAC SODIUM 75 MG/1
TABLET, DELAYED RELEASE ORAL
Qty: 60 TABLET | Refills: 0 | Status: SHIPPED | OUTPATIENT
Start: 2022-08-15

## 2023-02-13 DIAGNOSIS — M16.12 PRIMARY OSTEOARTHRITIS OF LEFT HIP: ICD-10-CM

## 2023-02-13 DIAGNOSIS — M70.62 TROCHANTERIC BURSITIS OF LEFT HIP: ICD-10-CM

## 2023-02-13 DIAGNOSIS — G57.02 PIRIFORMIS SYNDROME, LEFT: ICD-10-CM

## 2023-02-13 RX ORDER — DICLOFENAC SODIUM 75 MG/1
TABLET, DELAYED RELEASE ORAL
Qty: 60 TABLET | Refills: 0 | OUTPATIENT
Start: 2023-02-13

## 2023-02-13 NOTE — TELEPHONE ENCOUNTER
If patient to remain on this medication long-term/chronically then refills will have to be prescribed by PCP so that appropriate kidney/GI monitoring can be completed.